# Patient Record
Sex: FEMALE | Race: BLACK OR AFRICAN AMERICAN | NOT HISPANIC OR LATINO | Employment: FULL TIME | URBAN - METROPOLITAN AREA
[De-identification: names, ages, dates, MRNs, and addresses within clinical notes are randomized per-mention and may not be internally consistent; named-entity substitution may affect disease eponyms.]

---

## 2017-01-03 ENCOUNTER — PATIENT MESSAGE (OUTPATIENT)
Dept: FAMILY MEDICINE | Facility: CLINIC | Age: 42
End: 2017-01-03

## 2017-03-08 ENCOUNTER — PATIENT MESSAGE (OUTPATIENT)
Dept: FAMILY MEDICINE | Facility: CLINIC | Age: 42
End: 2017-03-08

## 2017-03-08 RX ORDER — MUPIROCIN 20 MG/G
OINTMENT TOPICAL
Qty: 30 G | Refills: 1 | Status: SHIPPED | OUTPATIENT
Start: 2017-03-08 | End: 2019-03-29 | Stop reason: SDUPTHER

## 2017-04-20 ENCOUNTER — PATIENT MESSAGE (OUTPATIENT)
Dept: FAMILY MEDICINE | Facility: CLINIC | Age: 42
End: 2017-04-20

## 2017-04-24 RX ORDER — FLUTICASONE PROPIONATE AND SALMETEROL 250; 50 UG/1; UG/1
POWDER RESPIRATORY (INHALATION)
Qty: 60 EACH | Refills: 2 | Status: SHIPPED | OUTPATIENT
Start: 2017-04-24 | End: 2017-12-15 | Stop reason: SDUPTHER

## 2017-05-07 ENCOUNTER — PATIENT MESSAGE (OUTPATIENT)
Dept: FAMILY MEDICINE | Facility: CLINIC | Age: 42
End: 2017-05-07

## 2017-05-08 ENCOUNTER — OFFICE VISIT (OUTPATIENT)
Dept: FAMILY MEDICINE | Facility: CLINIC | Age: 42
End: 2017-05-08
Payer: COMMERCIAL

## 2017-05-08 VITALS
HEIGHT: 63 IN | DIASTOLIC BLOOD PRESSURE: 74 MMHG | TEMPERATURE: 99 F | WEIGHT: 185.63 LBS | SYSTOLIC BLOOD PRESSURE: 122 MMHG | HEART RATE: 76 BPM | BODY MASS INDEX: 32.89 KG/M2 | RESPIRATION RATE: 18 BRPM

## 2017-05-08 DIAGNOSIS — J30.1 SEASONAL ALLERGIC RHINITIS DUE TO POLLEN: Primary | ICD-10-CM

## 2017-05-08 DIAGNOSIS — J06.9 VIRAL URI: ICD-10-CM

## 2017-05-08 PROCEDURE — 1160F RVW MEDS BY RX/DR IN RCRD: CPT | Mod: S$GLB,,, | Performed by: NURSE PRACTITIONER

## 2017-05-08 PROCEDURE — 3078F DIAST BP <80 MM HG: CPT | Mod: S$GLB,,, | Performed by: NURSE PRACTITIONER

## 2017-05-08 PROCEDURE — 3074F SYST BP LT 130 MM HG: CPT | Mod: S$GLB,,, | Performed by: NURSE PRACTITIONER

## 2017-05-08 PROCEDURE — 99214 OFFICE O/P EST MOD 30 MIN: CPT | Mod: S$GLB,,, | Performed by: NURSE PRACTITIONER

## 2017-05-08 RX ORDER — MONTELUKAST SODIUM 10 MG/1
10 TABLET ORAL NIGHTLY
Qty: 30 TABLET | Refills: 6 | Status: SHIPPED | OUTPATIENT
Start: 2017-05-08 | End: 2017-06-07

## 2017-05-08 RX ORDER — BENZONATATE 100 MG/1
100 CAPSULE ORAL 3 TIMES DAILY PRN
Qty: 30 CAPSULE | Refills: 0 | Status: SHIPPED | OUTPATIENT
Start: 2017-05-08 | End: 2017-05-18

## 2017-05-08 RX ORDER — IBUPROFEN 800 MG/1
TABLET ORAL
COMMUNITY
Start: 2017-05-06 | End: 2017-06-21 | Stop reason: DRUGHIGH

## 2017-05-08 NOTE — MR AVS SNAPSHOT
[id number] [address] [address]  [facility] [address]  Phone: [phone]  Fax: [phone]                  Noni Lazo   2017 8:40 AM   Office Visit    Description:  Female : 1975   Provider:  Batool Blackburn NP   Department:  [facility]           Reason for Visit     Bilateral ear pain     Sore Throat                To Do List           Goals (5 Years of Data)              Today    10/13/16    7/27/16    Blood Pressure < 140/90   122/74  122/74  122/74    BMI is less than 25             Follow-Up and Disposition     Return in about 1 week (around 5/15/2017).       These Medications        Disp Refills Start End    montelukast (SINGULAIR) 10 mg tablet 30 tablet 6 2017    Take 1 tablet (10 mg total) by mouth every evening. - Oral    Pharmacy: [facility]/pharmacy #[id number]  [place]  [address]. Ph #: [phone]       benzonatate (TESSALON) 100 MG capsule 30 capsule 0 2017    Take 1 capsule (100 mg total) by mouth 3 (three) times daily as needed for Cough. - Oral    Pharmacy: [facility]/pharmacy #[id number]  [place] - [address]. Ph #: [phone]         [facility]     [facility] Care [facility] -  Assistance  Unless otherwise directed by your provider, please contact [facility], our nurse care line that is available for  assistance.     Registered nurses in the [facility] provide: appointment scheduling, clinical advisement, health education, and other advisory services.  Call: [phone] (toll free)               Medications           Message regarding Medications     Verify the changes and/or additions to your medication regime listed below are the same as discussed with your clinician today.  If any of these changes or additions are incorrect, please notify your healthcare provider.        START taking these NEW medications        Refills    montelukast  (SINGULAIR) 10 mg tablet 6    Sig: Take 1 tablet (10 mg total) by mouth every evening.    Class: Normal    Route: Oral    benzonatate (TESSALON) 100 MG capsule 0    Sig: Take 1 capsule (100 mg total) by mouth 3 (three) times daily as needed for Cough.    Class: Normal    Route: Oral           Verify that the below list of medications is an accurate representation of the medications you are currently taking.  If none reported, the list may be blank. If incorrect, please contact your healthcare provider. Carry this list with you in case of emergency.           Current Medications     acetaminophen (TYLENOL) 500 MG tablet Take 2 tablets by mouth Once daily as needed.    amlodipine (NORVASC) 10 MG tablet Take 1 tablet (10 mg total) by mouth once daily.    azelastine-fluticasone 137-50 mcg/spray Spry nassal spray 1 spray by Each Nare route 2 (two) times daily as needed.    fluticasone (FLONASE) 50 mcg/actuation nasal spray 2 sprays by Each Nare route once daily.    fluticasone-salmeterol 250-50 mcg/dose (ADVAIR DISKUS) 250-50 mcg/dose diskus inhaler INHALE 1 PUFF INTO LUNGS AS NEEDED    ibuprofen (ADVIL,MOTRIN) 200 MG tablet Take 200 mg by mouth every 6 (six) hours as needed.      ibuprofen (ADVIL,MOTRIN) 800 MG tablet     mupirocin (BACTROBAN) 2 % ointment Apply topically as needed.    propranolol (INDERAL LA) 160 mg 24 hr capsule Take 1 capsule (160 mg total) by mouth once daily.    triamterene-hydrochlorothiazide 37.5-25 mg (DYAZIDE) 37.5-25 mg per capsule Take 1 capsule by mouth once daily.    benzonatate (TESSALON) 100 MG capsule Take 1 capsule (100 mg total) by mouth 3 (three) times daily as needed for Cough.    montelukast (SINGULAIR) 10 mg tablet Take 1 tablet (10 mg total) by mouth every evening.    sertraline (ZOLOFT) 25 MG tablet Take 1 tablet (25 mg total) by mouth once daily.           Clinical Reference Information           Your Vitals Were     BP Pulse Temp Resp Height Weight    122/74 (BP Location: Left  "arm, Patient Position: Sitting, BP Method: Manual) 76 98.7 °F (37.1 °C) (Oral) 18 5' 3" (1.6 m) 84.2 kg (185 lb 10 oz)    BMI                32.88 kg/m2          Blood Pressure          Most Recent Value    BP  122/74      Allergies as of 5/8/2017     Biaxin [Clarithromycin]    Epinephrine      Immunizations Administered on Date of Encounter - 5/8/2017     None      Language Assistance Services     ATTENTION: Language assistance services are available, free of charge. Please call [phone].      ATENCIÓN: Si habla español, tiene a pantoja disposición servicios gratuitos de asistencia lingüística. Llame al [phone].     CHÚ Ý: N?u b?n nói Ti?ng Vi?t, có các d?ch v? h? tr? ngôn ng? mi?n phí dành cho b?n. G?i s? [phone].         [facility] complies with applicable Federal civil rights laws and does not discriminate on the basis of race, color, national origin, age, disability, or sex.        "

## 2017-05-08 NOTE — PROGRESS NOTES
"Subjective:       Patient ID: Noni Lazo is a 42 y.o. female.    Chief Complaint: Bilateral ear pain (Started yesterday) and Sore Throat (Started yesterday)    Sore Throat    Associated symptoms include congestion and coughing. Pertinent negatives include no diarrhea, headaches or vomiting.   Onset yesterday.  Left sided ear pain. Left sided sinus congestion with yellow sputum. PND. No fever. Has been taking theraflu. Has been sneezing a lot over the past week. No other symptoms. See ROS    The following portion of the patients history was reviewed and updated as appropriate: allergies, current medications, past medical and surgical history. Past social history and problem list reviewed. Family PMH and Past social history reviewed. Tobacco, Illicit drug use reviewed.     Review of Systems   Constitutional: Positive for fatigue. Negative for chills and fever.   HENT: Positive for congestion, postnasal drip, rhinorrhea and sneezing. Negative for sinus pressure and sore throat.    Respiratory: Positive for cough. Negative for chest tightness and wheezing.    Cardiovascular: Negative for chest pain and palpitations.   Gastrointestinal: Negative for diarrhea, nausea and vomiting.   Musculoskeletal: Negative for myalgias.   Skin: Negative for rash and wound.   Neurological: Negative for weakness and headaches.            Objective:       /74 (BP Location: Left arm, Patient Position: Sitting, BP Method: Manual)  Pulse 76  Temp 98.7 °F (37.1 °C) (Oral)   Resp 18  Ht 5' 3" (1.6 m)  Wt 84.2 kg (185 lb 10 oz)  BMI 32.88 kg/m2     Physical Exam    Constitutional:  well-developed and well-nourished.   Head: Normocephalic.   Ears: Canals normal. Mild air/fluid levels bilaterally.  Nose: Rhinorrhea    No sinus tenderness present over maxillary sinus area.  Mouth/Throat: Uvula is midline and mucous membranes are normal. No Posterior oropharyngeal erythema present. PND  Eyes: Conjunctivae are normal. Pupils are " equal, round, and reactive to light.   Neck: Normal range of motion. Neck supple. mild inflammation and tenderness to anterior cervical lymph nodes  Cardiovascular: Normal rate and regular rhythm.    Pulmonary/Chest: Effort normal and breath sounds normal.   Skin:  W/D. No rashes or lesions   Musculoskeletal: Normal range of motion.   Assessment:       1. Seasonal allergic rhinitis due to pollen    2. Viral URI        Plan:         Noni was seen today for bilateral ear pain and sore throat.    Diagnoses and all orders for this visit:    Seasonal allergic rhinitis due to pollen: presents as allergies and viral URI. Continue the flonase nasal spray. Refill singulair. Take as directed.     Viral URI: presents as viral. No indication that antibiotics are needed at this time.    Other orders  -     montelukast (SINGULAIR) 10 mg tablet; Take 1 tablet (10 mg total) by mouth every evening.  -     benzonatate (TESSALON) 100 MG capsule; Take 1 capsule (100 mg total) by mouth 3 (three) times daily as needed for Cough.    Continue current medication  Take medications only as prescribed  Healthy diet, exercise  Adequate rest  Adequate hydration  Avoid allergens  Avoid excessive caffeine

## 2017-05-08 NOTE — LETTER
May 8, 2017      [facility]  [address] [address] [address]  [facility] [address]  Phone: [phone]  Fax: [phone]       Patient: Noni Lazo   YOB: 1975  Date of Visit: 05/08/2017    To Whom It May Concern:    Noni  was at [facility] on 05/08/2017.  Please excuse Noni from her work absence.   She may return to work on 05/09/2017.   If you have any questions or concerns, or if I can be of further assistance, please do not hesitate to contact me.    Sincerely,      Batool Blackburn NP

## 2017-05-08 NOTE — TELEPHONE ENCOUNTER
Spoke with pt and she has complaints of congestion and ear pain.  Stated she is coming to clinic to bring her mother.  sched pt also with ANGIE Blackburn.

## 2017-06-01 ENCOUNTER — PATIENT MESSAGE (OUTPATIENT)
Dept: FAMILY MEDICINE | Facility: CLINIC | Age: 42
End: 2017-06-01

## 2017-06-01 NOTE — LETTER
June 1, 2017    Noni Lazo  [address] [address]             [facility]  [id number] [place] [address] [address]  [facility] [address]  Phone: [phone]  Fax: [phone] Dear Whom it may concern:    Mrs. Noni Lazo is being seen in my office. The patient is currently in good health. She is being treated for high blood pressure, which at this time her condition is controlled.      If you have any questions or concerns, please don't hesitate to call.    Sincerely,        ANGIE Rajput

## 2017-06-02 ENCOUNTER — PATIENT MESSAGE (OUTPATIENT)
Dept: FAMILY MEDICINE | Facility: CLINIC | Age: 42
End: 2017-06-02

## 2017-06-02 RX ORDER — ESOMEPRAZOLE MAGNESIUM 40 MG/1
40 CAPSULE, DELAYED RELEASE ORAL
Qty: 30 CAPSULE | Refills: 11 | Status: SHIPPED | OUTPATIENT
Start: 2017-06-02 | End: 2017-11-01

## 2017-06-02 RX ORDER — BENZONATATE 200 MG/1
200 CAPSULE ORAL 3 TIMES DAILY PRN
Qty: 30 CAPSULE | Refills: 0 | Status: SHIPPED | OUTPATIENT
Start: 2017-06-02 | End: 2017-06-12

## 2017-06-02 NOTE — TELEPHONE ENCOUNTER
LOV 5/8/17. Pt is requesting something to help with her cough that has no subsided since her last visit. Please advise. GORDO Hammer LPN

## 2017-06-19 ENCOUNTER — PATIENT MESSAGE (OUTPATIENT)
Dept: FAMILY MEDICINE | Facility: CLINIC | Age: 42
End: 2017-06-19

## 2017-06-20 RX ORDER — SERTRALINE HYDROCHLORIDE 25 MG/1
25 TABLET, FILM COATED ORAL DAILY
Qty: 30 TABLET | Refills: 6 | Status: SHIPPED | OUTPATIENT
Start: 2017-06-20 | End: 2018-03-23

## 2017-06-21 ENCOUNTER — OFFICE VISIT (OUTPATIENT)
Dept: FAMILY MEDICINE | Facility: CLINIC | Age: 42
End: 2017-06-21
Payer: COMMERCIAL

## 2017-06-21 ENCOUNTER — PATIENT MESSAGE (OUTPATIENT)
Dept: FAMILY MEDICINE | Facility: CLINIC | Age: 42
End: 2017-06-21

## 2017-06-21 VITALS
RESPIRATION RATE: 18 BRPM | HEART RATE: 60 BPM | TEMPERATURE: 99 F | WEIGHT: 186.63 LBS | DIASTOLIC BLOOD PRESSURE: 74 MMHG | BODY MASS INDEX: 33.07 KG/M2 | SYSTOLIC BLOOD PRESSURE: 112 MMHG | HEIGHT: 63 IN

## 2017-06-21 DIAGNOSIS — H66.92 LEFT OTITIS MEDIA, UNSPECIFIED CHRONICITY, UNSPECIFIED OTITIS MEDIA TYPE: Primary | ICD-10-CM

## 2017-06-21 DIAGNOSIS — R42 DIZZINESS: ICD-10-CM

## 2017-06-21 PROCEDURE — 99214 OFFICE O/P EST MOD 30 MIN: CPT | Mod: S$GLB,,, | Performed by: NURSE PRACTITIONER

## 2017-06-21 RX ORDER — AMOXICILLIN 875 MG/1
875 TABLET, FILM COATED ORAL EVERY 12 HOURS
Qty: 14 TABLET | Refills: 0 | Status: SHIPPED | OUTPATIENT
Start: 2017-06-21 | End: 2017-06-28

## 2017-06-21 RX ORDER — PREDNISONE 20 MG/1
40 TABLET ORAL DAILY
Qty: 20 TABLET | Refills: 0 | Status: SHIPPED | OUTPATIENT
Start: 2017-06-21 | End: 2017-07-01

## 2017-06-22 NOTE — TELEPHONE ENCOUNTER
Per Chanelle: ok to take prednisone twice a day instead of two at a time. Pt notified via my chart. GORDO Hammer LPN

## 2017-06-22 NOTE — PROGRESS NOTES
"Subjective:       Patient ID: Noni Lazo is a 42 y.o. female.    Chief Complaint: Dizziness and Sinus Pressure    Dizziness:   Chronicity:  New  Onset: 2-3 days ago.  Progression since onset:  Unchanged  Frequency:  Every few hours  Duration:  Very brief  Dizziness characteristics:  Spacial disorientation, sensation of movement and off-balance   Associated symptoms: ear congestion, ear pain and headaches.no hearing loss, no fever, no tinnitus, no nausea, no vomiting, no diaphoresis, no aural fullness, no weakness, no visual disturbances, no light-headedness, no palpitations, no panic, no facial weakness, no slurred speech, no numbness in extremities and no chest pain.    Review of Systems   Constitutional: Negative for activity change, diaphoresis, fever and unexpected weight change.   HENT: Positive for ear pain and postnasal drip. Negative for hearing loss, rhinorrhea, tinnitus and trouble swallowing.    Eyes: Positive for discharge and visual disturbance.   Respiratory: Negative for chest tightness and wheezing.    Cardiovascular: Negative for chest pain and palpitations.   Gastrointestinal: Negative for blood in stool, constipation, diarrhea, nausea and vomiting.   Endocrine: Negative for polydipsia and polyuria.   Genitourinary: Negative for difficulty urinating, dysuria, hematuria and menstrual problem.   Musculoskeletal: Negative for arthralgias, joint swelling and neck pain.   Neurological: Positive for dizziness and headaches. Negative for weakness and light-headedness.   Psychiatric/Behavioral: Negative for confusion and dysphoric mood.       Objective:       Vitals:    06/21/17 1554   BP: 112/74   Pulse: 60   Resp: 18   Temp: 98.6 °F (37 °C)   TempSrc: Oral   Weight: 84.6 kg (186 lb 9.9 oz)   Height: 5' 3" (1.6 m)   PainSc: 0-No pain       Physical Exam   Constitutional: She is oriented to person, place, and time. She appears well-developed.   Obese female    HENT:   Head: Normocephalic and " atraumatic.   Right Ear: External ear normal. Tympanic membrane mobility is abnormal.   Left Ear: External ear normal. Tympanic membrane is erythematous and bulging.   Nose: Mucosal edema and rhinorrhea present.   Mouth/Throat: Posterior oropharyngeal edema present. No posterior oropharyngeal erythema.   Eyes: Conjunctivae are normal. Pupils are equal, round, and reactive to light.   Neck: Normal range of motion. Neck supple. No tracheal deviation present.   Cardiovascular: Normal rate and regular rhythm.  Exam reveals no gallop and no friction rub.    No murmur heard.  Pulmonary/Chest: Effort normal and breath sounds normal. No stridor. No respiratory distress. She has no wheezes. She has no rales.   Musculoskeletal: Normal range of motion.   Lymphadenopathy:     She has no cervical adenopathy.   Neurological: She is alert and oriented to person, place, and time.   Skin: Skin is warm and dry.   Psychiatric: She has a normal mood and affect. Her behavior is normal. Judgment and thought content normal.       Assessment:       1. Left otitis media, unspecified chronicity, unspecified otitis media type    2. Dizziness        Plan:       Noni was seen today for dizziness and sinus pressure.    Diagnoses and all orders for this visit:    Otitis media to the left side  -     amoxicillin (AMOXIL) 875 MG tablet; Take 1 tablet (875 mg total) by mouth every 12 (twelve) hours.    Dizziness  -     predniSONE (DELTASONE) 20 MG tablet; Take 2 tablets (40 mg total) by mouth once daily.

## 2017-07-12 ENCOUNTER — PATIENT MESSAGE (OUTPATIENT)
Dept: FAMILY MEDICINE | Facility: CLINIC | Age: 42
End: 2017-07-12

## 2017-08-11 RX ORDER — PROPRANOLOL HYDROCHLORIDE 160 MG/1
160 CAPSULE, EXTENDED RELEASE ORAL DAILY
Qty: 90 CAPSULE | Refills: 3 | Status: SHIPPED | OUTPATIENT
Start: 2017-08-11 | End: 2017-08-21 | Stop reason: SDUPTHER

## 2017-08-19 ENCOUNTER — PATIENT MESSAGE (OUTPATIENT)
Dept: FAMILY MEDICINE | Facility: CLINIC | Age: 42
End: 2017-08-19

## 2017-08-21 RX ORDER — TRIAMTERENE AND HYDROCHLOROTHIAZIDE 37.5; 25 MG/1; MG/1
1 CAPSULE ORAL DAILY
Qty: 90 CAPSULE | Refills: 3 | Status: SHIPPED | OUTPATIENT
Start: 2017-08-21 | End: 2018-10-27 | Stop reason: SDUPTHER

## 2017-08-21 RX ORDER — PROPRANOLOL HYDROCHLORIDE 160 MG/1
160 CAPSULE, EXTENDED RELEASE ORAL DAILY
Qty: 90 CAPSULE | Refills: 3 | Status: SHIPPED | OUTPATIENT
Start: 2017-08-21 | End: 2018-09-05 | Stop reason: SDUPTHER

## 2017-08-21 RX ORDER — AMLODIPINE BESYLATE 10 MG/1
10 TABLET ORAL DAILY
Qty: 90 TABLET | Refills: 2 | Status: SHIPPED | OUTPATIENT
Start: 2017-08-21 | End: 2018-07-14 | Stop reason: SDUPTHER

## 2017-08-24 ENCOUNTER — OFFICE VISIT (OUTPATIENT)
Dept: FAMILY MEDICINE | Facility: CLINIC | Age: 42
End: 2017-08-24

## 2017-08-24 VITALS
SYSTOLIC BLOOD PRESSURE: 126 MMHG | DIASTOLIC BLOOD PRESSURE: 74 MMHG | HEIGHT: 63 IN | WEIGHT: 188.5 LBS | RESPIRATION RATE: 16 BRPM | BODY MASS INDEX: 33.4 KG/M2 | TEMPERATURE: 98 F | HEART RATE: 80 BPM

## 2017-08-24 DIAGNOSIS — R35.0 FREQUENCY OF URINATION: ICD-10-CM

## 2017-08-24 DIAGNOSIS — J01.00 ACUTE MAXILLARY SINUSITIS, RECURRENCE NOT SPECIFIED: ICD-10-CM

## 2017-08-24 DIAGNOSIS — J30.1 ACUTE NONSEASONAL ALLERGIC RHINITIS DUE TO POLLEN: Primary | ICD-10-CM

## 2017-08-24 DIAGNOSIS — R42 DIZZINESS: ICD-10-CM

## 2017-08-24 DIAGNOSIS — R53.83 FATIGUE, UNSPECIFIED TYPE: ICD-10-CM

## 2017-08-24 LAB
BILIRUB SERPL-MCNC: NORMAL MG/DL
BLOOD URINE, POC: NORMAL
COLOR, POC UA: NORMAL
GLUCOSE UR QL STRIP: NORMAL
KETONES UR QL STRIP: NORMAL
LEUKOCYTE ESTERASE URINE, POC: NORMAL
NITRITE, POC UA: NORMAL
PH, POC UA: 7
PROTEIN, POC: NORMAL
SPECIFIC GRAVITY, POC UA: 1
UROBILINOGEN, POC UA: NORMAL

## 2017-08-24 PROCEDURE — 99214 OFFICE O/P EST MOD 30 MIN: CPT | Mod: 25,S$GLB,, | Performed by: NURSE PRACTITIONER

## 2017-08-24 PROCEDURE — 3074F SYST BP LT 130 MM HG: CPT | Mod: S$GLB,,, | Performed by: NURSE PRACTITIONER

## 2017-08-24 PROCEDURE — 96372 THER/PROPH/DIAG INJ SC/IM: CPT | Mod: S$GLB,,, | Performed by: NURSE PRACTITIONER

## 2017-08-24 PROCEDURE — 3008F BODY MASS INDEX DOCD: CPT | Mod: S$GLB,,, | Performed by: NURSE PRACTITIONER

## 2017-08-24 PROCEDURE — 81002 URINALYSIS NONAUTO W/O SCOPE: CPT | Mod: S$GLB,,, | Performed by: NURSE PRACTITIONER

## 2017-08-24 PROCEDURE — 3078F DIAST BP <80 MM HG: CPT | Mod: S$GLB,,, | Performed by: NURSE PRACTITIONER

## 2017-08-24 RX ORDER — DEXAMETHASONE SODIUM PHOSPHATE 4 MG/ML
8 INJECTION, SOLUTION INTRA-ARTICULAR; INTRALESIONAL; INTRAMUSCULAR; INTRAVENOUS; SOFT TISSUE ONCE
Status: COMPLETED | OUTPATIENT
Start: 2017-08-24 | End: 2017-08-24

## 2017-08-24 RX ORDER — AMOXICILLIN 875 MG/1
875 TABLET, FILM COATED ORAL EVERY 12 HOURS
Qty: 14 TABLET | Refills: 0 | Status: SHIPPED | OUTPATIENT
Start: 2017-08-24 | End: 2017-09-28

## 2017-08-24 RX ADMIN — DEXAMETHASONE SODIUM PHOSPHATE 8 MG: 4 INJECTION, SOLUTION INTRA-ARTICULAR; INTRALESIONAL; INTRAMUSCULAR; INTRAVENOUS; SOFT TISSUE at 12:08

## 2017-08-24 NOTE — LETTER
August 24, 2017      [facility]  [address] [address] [address]  [facility] [address]  Phone: [phone]  Fax: [phone]       Patient: Noni Lazo   YOB: 1975  Date of Visit: 08/24/2017    To Whom It May Concern:    Mark Lazo  was at [facility] on 08/24/2017. She may return to work/school on 08/28/2017.  If you have any questions or concerns, or if I can be of further assistance, please do not hesitate to contact me.    Sincerely,      Batool BlackburnNP

## 2017-08-25 NOTE — PROGRESS NOTES
Subjective:       Patient ID: Noni Lazo is a 42 y.o. female.    Chief Complaint: fatigue, dizzy, spinning and Urinary Frequency    HPI onset the past week with sinus congestion, dizziness off and on. Pressure to both ears. Thick PND.  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 She feels this is what triggered this episode. She has been feeling a lot of pressure in her head since being exposed to this every day. She feels worse when at school and around it. They are now finished with the project and she feels she has infection from it. She denies any fever. She is more fatigued. She has also noticed some urinary frequency. NO urgency or burning with urination. She is trying to drink plenty of fluids. See ROS.    The following portion of the patients history was reviewed and updated as appropriate: allergies, current medications, past medical and surgical history. Past social history and problem list reviewed. Family PMH and Past social history reviewed. Tobacco, Illicit drug use reviewed.     Review of Systems  Constitutional: increased fatigue    No fever    HENT: mild sore throat   Lots of nasal congestion. Pressure and tenderness to sinus area. Ears with pressure, popping. No voice changes    Eyes: No vision changes, blurred vision  Skin: no rashes or lesions  Respiratory:   No SOB, Wheezing, cough  Cardiovascular:   No CP, Palpitations  Gastrointestinal:   No N/V/D. No abdominal pain, weight stable. Appetite good.   Genitourinary:   No dysuria, urgency   Positive for frequency. No change in bowels. No blood in stools.   Musculoskeletal:   No joint pain  No change in gait or coordination. .  Neurological:   positive for dizziness. and headaches  Hematological: No abnormal bruising or bleeding    Psychiatric/Behavioral Negative for suicidal ideas.  Denies feelings of depression. No thoughts of wanting to harm self or others.     Objective:  "    /74   Pulse 80   Temp 98.3 °F (36.8 °C) (Oral)   Resp 16   Ht 5' 3" (1.6 m)   Wt 85.5 kg (188 lb 7.9 oz)   LMP 08/24/2016   BMI 33.39 kg/m²      Physical Exam     Constitutional: oriented to person, place, and time. well-developed and well-nourished.   HENT: throat clear of exudate. Thick PND noted to posterior pharynx. Sinus area tenderness and fullness. Canals clear. TM retracted.   Head: Normocephalic.   Eyes: Conjunctivae are normal. Pupils are equal, round, and reactive to light.   Neck: Normal range of motion. Neck supple. No tracheal deviation present. No thyromegaly present. enlarged and tender anterior cervical lymph nodes.  Cardiovascular: Normal rate, regular rhythm and normal heart sounds.    Pulmonary/Chest: Effort normal and breath sounds normal. No respiratory distress. No wheezes.   Abdominal: Soft. Bowel sounds are normal. No distension. There is no tenderness.   Musculoskeletal: Normal range of motion. Gait and coordination normal.   Neurological: oriented to person, place, and time.   Skin: Skin is warm and dry. No rashes or lesions  Psychiatric: Normal mood and affect.Behavior is normal. Judgment and thought content normal.   Assessment:       1. Acute nonseasonal allergic rhinitis due to pollen    2. Acute maxillary sinusitis, recurrence not specified    3. Frequency of urination    4. Dizziness    5. Fatigue, unspecified type        Plan:         Noni was seen today for fatigue, dizzy, spinning and urinary frequency.    Diagnoses and all orders for this visit:    Acute nonseasonal allergic rhinitis due to pollen: continue the flonase. Take zyrtec daily.     Acute maxillary sinusitis, recurrence not specified: will cover with amoxil. Take as directed.     Frequency of urination: urine was negative.   -     POCT URINE DIPSTICK WITHOUT MICROSCOPE  Results for orders placed or performed in visit on 08/24/17   POCT URINE DIPSTICK WITHOUT MICROSCOPE   Result Value Ref Range    " Color, UA lite yellow     Spec Grav UA 1.000     pH, UA 7     WBC, UA ++     Nitrite, UA neg     Protein neg     Glucose, UA normal     Ketones, UA neg     Urobilinogen, UA normal     Bilirubin neg     Blood, UA neg      Dizziness  -     dexamethasone injection 8 mg; Inject 2 mLs (8 mg total) into the muscle once.  Risks and benefits discussed. Potential side effects such as anxiety, palpitations and flushing discussed. May increase blood glucose levels over the next 48 hours. Potential for skin atrophy at injection site. Tolerated injection well.    Fatigue, unspecified type: should improve with resolution of allergies.     Other orders  -     amoxicillin (AMOXIL) 875 MG tablet; Take 1 tablet (875 mg total) by mouth every 12 (twelve) hours.    Continue current medication  Take medications only as prescribed  Healthy diet, exercise  Adequate rest  Adequate hydration  Avoid allergens  Avoid excessive caffeine

## 2017-08-28 ENCOUNTER — PATIENT MESSAGE (OUTPATIENT)
Dept: FAMILY MEDICINE | Facility: CLINIC | Age: 42
End: 2017-08-28

## 2017-08-28 RX ORDER — FLUTICASONE PROPIONATE 50 MCG
SPRAY, SUSPENSION (ML) NASAL
Qty: 16 G | Refills: 2 | Status: SHIPPED | OUTPATIENT
Start: 2017-08-28 | End: 2017-11-01 | Stop reason: SDUPTHER

## 2017-08-29 ENCOUNTER — PATIENT MESSAGE (OUTPATIENT)
Dept: FAMILY MEDICINE | Facility: CLINIC | Age: 42
End: 2017-08-29

## 2017-08-30 ENCOUNTER — PATIENT MESSAGE (OUTPATIENT)
Dept: FAMILY MEDICINE | Facility: CLINIC | Age: 42
End: 2017-08-30

## 2017-08-31 RX ORDER — FLUTICASONE PROPIONATE 50 MCG
SPRAY, SUSPENSION (ML) NASAL
Refills: 2 | OUTPATIENT
Start: 2017-08-31

## 2017-09-27 ENCOUNTER — PATIENT MESSAGE (OUTPATIENT)
Dept: FAMILY MEDICINE | Facility: CLINIC | Age: 42
End: 2017-09-27

## 2017-09-28 RX ORDER — AMOXICILLIN 875 MG/1
875 TABLET, FILM COATED ORAL EVERY 12 HOURS
Qty: 20 TABLET | Refills: 0 | Status: SHIPPED | OUTPATIENT
Start: 2017-09-28 | End: 2017-11-01 | Stop reason: ALTCHOICE

## 2017-09-28 NOTE — TELEPHONE ENCOUNTER
Sent amoxil to the pharmacy. Most likely allergies if no fever or green mucous. Continue allergy medication. Need to see ENT if these symptoms continue to flare up as frequently as they have been.

## 2017-10-10 DIAGNOSIS — I10 ESSENTIAL HYPERTENSION: Primary | ICD-10-CM

## 2017-10-11 RX ORDER — TRIAMTERENE AND HYDROCHLOROTHIAZIDE 37.5; 25 MG/1; MG/1
1 CAPSULE ORAL DAILY
Qty: 90 CAPSULE | Refills: 0 | Status: SHIPPED | OUTPATIENT
Start: 2017-10-11 | End: 2017-11-01 | Stop reason: SDUPTHER

## 2017-10-11 NOTE — TELEPHONE ENCOUNTER
She is due for labs to check her potassium, etc since on BP medication. I will place labs, please schedule her to have those done soon. One refill of BP medication sent until labs are reviewed.

## 2017-10-13 NOTE — TELEPHONE ENCOUNTER
Attempted to contact pt. No voicemail set up to leave message to return call to clinic. Call rang several times before going to busy signal.

## 2017-10-20 ENCOUNTER — LAB VISIT (OUTPATIENT)
Dept: LAB | Facility: HOSPITAL | Age: 42
End: 2017-10-20
Attending: NURSE PRACTITIONER
Payer: COMMERCIAL

## 2017-10-20 DIAGNOSIS — I10 ESSENTIAL HYPERTENSION: ICD-10-CM

## 2017-10-20 LAB
ALBUMIN SERPL BCP-MCNC: 3.8 G/DL
ALP SERPL-CCNC: 58 U/L
ALT SERPL W/O P-5'-P-CCNC: 27 U/L
ANION GAP SERPL CALC-SCNC: 15 MMOL/L
AST SERPL-CCNC: 23 U/L
BASOPHILS # BLD AUTO: 0.06 K/UL
BASOPHILS NFR BLD: 1.1 %
BILIRUB SERPL-MCNC: 0.4 MG/DL
BUN SERPL-MCNC: 11 MG/DL
CALCIUM SERPL-MCNC: 10.1 MG/DL
CHLORIDE SERPL-SCNC: 99 MMOL/L
CHOLEST SERPL-MCNC: 188 MG/DL
CHOLEST/HDLC SERPL: 3.7 {RATIO}
CO2 SERPL-SCNC: 27 MMOL/L
CREAT SERPL-MCNC: 0.9 MG/DL
DIFFERENTIAL METHOD: ABNORMAL
EOSINOPHIL # BLD AUTO: 0.1 K/UL
EOSINOPHIL NFR BLD: 2.1 %
ERYTHROCYTE [DISTWIDTH] IN BLOOD BY AUTOMATED COUNT: 13.6 %
EST. GFR  (AFRICAN AMERICAN): >60 ML/MIN/1.73 M^2
EST. GFR  (NON AFRICAN AMERICAN): >60 ML/MIN/1.73 M^2
GLUCOSE SERPL-MCNC: 107 MG/DL
HCT VFR BLD AUTO: 43.1 %
HDLC SERPL-MCNC: 51 MG/DL
HDLC SERPL: 27.1 %
HGB BLD-MCNC: 14 G/DL
IMM GRANULOCYTES # BLD AUTO: 0.01 K/UL
IMM GRANULOCYTES NFR BLD AUTO: 0.2 %
LDLC SERPL CALC-MCNC: 118 MG/DL
LYMPHOCYTES # BLD AUTO: 1.8 K/UL
LYMPHOCYTES NFR BLD: 31.8 %
MCH RBC QN AUTO: 26.8 PG
MCHC RBC AUTO-ENTMCNC: 32.5 G/DL
MCV RBC AUTO: 82 FL
MONOCYTES # BLD AUTO: 0.5 K/UL
MONOCYTES NFR BLD: 9.1 %
NEUTROPHILS # BLD AUTO: 3.1 K/UL
NEUTROPHILS NFR BLD: 55.7 %
NONHDLC SERPL-MCNC: 137 MG/DL
NRBC BLD-RTO: 0 /100 WBC
PLATELET # BLD AUTO: 407 K/UL
PMV BLD AUTO: 11.1 FL
POTASSIUM SERPL-SCNC: 2.9 MMOL/L
PROT SERPL-MCNC: 7.9 G/DL
RBC # BLD AUTO: 5.23 M/UL
SODIUM SERPL-SCNC: 141 MMOL/L
TRIGL SERPL-MCNC: 95 MG/DL
WBC # BLD AUTO: 5.6 K/UL

## 2017-10-20 PROCEDURE — 80061 LIPID PANEL: CPT

## 2017-10-20 PROCEDURE — 85025 COMPLETE CBC W/AUTO DIFF WBC: CPT

## 2017-10-20 PROCEDURE — 80053 COMPREHEN METABOLIC PANEL: CPT

## 2017-10-20 PROCEDURE — 36415 COLL VENOUS BLD VENIPUNCTURE: CPT | Mod: PO

## 2017-10-23 ENCOUNTER — TELEPHONE (OUTPATIENT)
Dept: FAMILY MEDICINE | Facility: CLINIC | Age: 42
End: 2017-10-23

## 2017-10-23 DIAGNOSIS — E87.6 LOW BLOOD POTASSIUM: Primary | ICD-10-CM

## 2017-10-23 RX ORDER — POTASSIUM CHLORIDE 20 MEQ/1
20 TABLET, EXTENDED RELEASE ORAL 2 TIMES DAILY
Qty: 60 TABLET | Refills: 0 | Status: SHIPPED | OUTPATIENT
Start: 2017-10-23 | End: 2018-03-23

## 2017-10-23 NOTE — TELEPHONE ENCOUNTER
She has a potassium of 2.9.  She needs replacement supplement. I will send to pharmacy. Need to repeat her CMP to see if this is an accurate value. Orders placed. If she is having any CP, SOB or palpitations she  Needs to go to the ER.

## 2017-10-23 NOTE — TELEPHONE ENCOUNTER
Spoke to pt.  She verbalized understanding of lab result and of low potassium and recommendations.  Pt stated she now and then has palpitations, but could be due to her running around a lot because her mother is in the hospital.  Pt stated she will try to make it to the lab in [place] to repeat on wed.  cmp pended.

## 2017-10-24 NOTE — TELEPHONE ENCOUNTER
Pt contacted and stated she can not make an appointment at this time she will call when she has time

## 2017-11-01 ENCOUNTER — LAB VISIT (OUTPATIENT)
Dept: LAB | Facility: HOSPITAL | Age: 42
End: 2017-11-01
Attending: NURSE PRACTITIONER
Payer: COMMERCIAL

## 2017-11-01 ENCOUNTER — OFFICE VISIT (OUTPATIENT)
Dept: FAMILY MEDICINE | Facility: CLINIC | Age: 42
End: 2017-11-01
Payer: COMMERCIAL

## 2017-11-01 VITALS
DIASTOLIC BLOOD PRESSURE: 74 MMHG | OXYGEN SATURATION: 98 % | TEMPERATURE: 98 F | RESPIRATION RATE: 16 BRPM | BODY MASS INDEX: 31.43 KG/M2 | HEART RATE: 79 BPM | WEIGHT: 177.38 LBS | HEIGHT: 63 IN | SYSTOLIC BLOOD PRESSURE: 128 MMHG

## 2017-11-01 DIAGNOSIS — J30.89 CHRONIC NONSEASONAL ALLERGIC RHINITIS DUE TO POLLEN: ICD-10-CM

## 2017-11-01 DIAGNOSIS — I10 ESSENTIAL HYPERTENSION: ICD-10-CM

## 2017-11-01 DIAGNOSIS — E87.6 LOW SERUM POTASSIUM: Primary | ICD-10-CM

## 2017-11-01 DIAGNOSIS — E87.6 LOW BLOOD POTASSIUM: ICD-10-CM

## 2017-11-01 LAB
ALBUMIN SERPL BCP-MCNC: 3.6 G/DL
ALP SERPL-CCNC: 50 U/L
ALT SERPL W/O P-5'-P-CCNC: 24 U/L
ANION GAP SERPL CALC-SCNC: 8 MMOL/L
AST SERPL-CCNC: 20 U/L
BILIRUB SERPL-MCNC: 0.3 MG/DL
BUN SERPL-MCNC: 11 MG/DL
CALCIUM SERPL-MCNC: 9.5 MG/DL
CHLORIDE SERPL-SCNC: 103 MMOL/L
CO2 SERPL-SCNC: 32 MMOL/L
CREAT SERPL-MCNC: 0.8 MG/DL
EST. GFR  (AFRICAN AMERICAN): >60 ML/MIN/1.73 M^2
EST. GFR  (NON AFRICAN AMERICAN): >60 ML/MIN/1.73 M^2
GLUCOSE SERPL-MCNC: 119 MG/DL
POTASSIUM SERPL-SCNC: 3.4 MMOL/L
PROT SERPL-MCNC: 7.2 G/DL
SODIUM SERPL-SCNC: 143 MMOL/L

## 2017-11-01 PROCEDURE — 80053 COMPREHEN METABOLIC PANEL: CPT

## 2017-11-01 PROCEDURE — 99214 OFFICE O/P EST MOD 30 MIN: CPT | Mod: S$GLB,,, | Performed by: NURSE PRACTITIONER

## 2017-11-01 PROCEDURE — 36415 COLL VENOUS BLD VENIPUNCTURE: CPT | Mod: PO

## 2017-11-01 RX ORDER — FLUTICASONE PROPIONATE 50 MCG
2 SPRAY, SUSPENSION (ML) NASAL DAILY
Qty: 16 G | Refills: 2 | Status: SHIPPED | OUTPATIENT
Start: 2017-11-01 | End: 2018-03-23 | Stop reason: SDUPTHER

## 2017-11-01 RX ORDER — MONTELUKAST SODIUM 10 MG/1
10 TABLET ORAL NIGHTLY
Qty: 30 TABLET | Refills: 3 | Status: SHIPPED | OUTPATIENT
Start: 2017-11-01 | End: 2019-03-18

## 2017-11-01 RX ORDER — METHOCARBAMOL 750 MG/1
TABLET, FILM COATED ORAL
Refills: 0 | COMMUNITY
Start: 2017-10-19 | End: 2018-03-23

## 2017-11-01 RX ORDER — MONTELUKAST SODIUM 10 MG/1
10 TABLET ORAL NIGHTLY
COMMUNITY
End: 2017-11-01 | Stop reason: SDUPTHER

## 2017-11-01 NOTE — PROGRESS NOTES
Answers for HPI/ROS submitted by the patient on 10/29/2017   activity change: No  unexpected weight change: No  neck pain: No  hearing loss: No  rhinorrhea: No  trouble swallowing: No  eye discharge: No  visual disturbance: No  chest tightness: No  wheezing: No  chest pain: No  palpitations: Yes  blood in stool: No  constipation: No  vomiting: No  diarrhea: No  polydipsia: No  polyuria: No  difficulty urinating: No  hematuria: No  menstrual problem: No  dysuria: No  joint swelling: No  arthralgias: No  headaches: Yes  weakness: No  confusion: No  dysphoric mood: Yes

## 2017-11-01 NOTE — PROGRESS NOTES
"Subjective:       Patient ID: Noni Lazo is a 42 y.o. female.    Chief Complaint: Follow-up    HPI Answers for HPI/ROS submitted by the patient on 10/29/2017   activity change: No  unexpected weight change: No  neck pain: No  hearing loss: No  rhinorrhea: No  trouble swallowing: No  eye discharge: No  visual disturbance: No  chest tightness: No  wheezing: No  chest pain: No  palpitations: Yes  blood in stool: No  constipation: No  vomiting: No  diarrhea: No  polydipsia: No  polyuria: No  difficulty urinating: No  hematuria: No  menstrual problem: No  dysuria: No  joint swelling: No  arthralgias: No  headaches: Yes  weakness: No  confusion: No  dysphoric mood: Yes    Here for follow up on low potassium. She went this morning to have her repeat labs done. She did not start the potassium I sent in for her until yesterday, states she was just eating a lot of bananas. She has had a few episodes of feeling like palpitations but those do not last long. Denies any "chest pain". She has been under more stress with her mother being sick again. She denies any acute issues today. See ROS.      The following portion of the patients history was reviewed and updated as appropriate: allergies, current medications, past medical and surgical history. Past social history and problem list reviewed. Family PMH and Past social history reviewed. Tobacco, Illicit drug use reviewed.     Review of Systems  Constitutional: No fatigue or fever    HENT: no sore throat or nasal congestion. No voice changes    Eyes: No vision changes, blurred vision  Skin: no rashes or lesions  Respiratory:   No SOB, Wheezing, cough  Cardiovascular:   No CP.  A few episodes of  Palpitations, noting that lasts long.   Gastrointestinal:   No N/V/D. No abdominal pain, weight stable. Appetite good.   Genitourinary:   No dysuria, urgency or frequency. No change in bowels. No blood in stools.   Musculoskeletal:   No joint pain  No change in gait or coordination. " ".  Neurological:   No dizziness. No headaches  Hematological: No abnormal bruising or bleeding    Psychiatric/Behavioral Negative for suicidal ideas.  Denies feelings of depression. No thoughts of wanting to harm self or others.     Objective:     /74   Pulse 79   Temp 98.4 °F (36.9 °C) (Oral)   Resp 16   Ht 5' 3" (1.6 m)   Wt 80.5 kg (177 lb 6.4 oz)   SpO2 98%   BMI 31.42 kg/m²      Physical Exam     Constitutional: oriented to person, place, and time. well-developed and well-nourished.   HENT: normal  Head: Normocephalic.   Eyes: Conjunctivae are normal. Pupils are equal, round, and reactive to light.   Neck: Normal range of motion. Neck supple. No tracheal deviation present. No thyromegaly present.   Cardiovascular: Normal rate, regular rhythm and normal heart sounds.    Pulmonary/Chest: Effort normal and breath sounds normal. No respiratory distress. No wheezes.   Musculoskeletal: Normal range of motion. Gait and coordination normal  Assessment:       1. Low serum potassium    2. Essential hypertension    3. Chronic nonseasonal allergic rhinitis due to pollen        Plan:         Noni was seen today for follow-up.    Diagnoses and all orders for this visit:    Low serum potassium: had labs redrawn this morning.  Will call her when those results are  Available.    Essential hypertension: good control. Continue current medications    Chronic nonseasonal allergic rhinitis due to pollen: refill singulair and flonase.     Other orders  -     montelukast (SINGULAIR) 10 mg tablet; Take 1 tablet (10 mg total) by mouth every evening.  -     fluticasone (FLONASE) 50 mcg/actuation nasal spray; 2 sprays by Each Nare route once daily.    Continue current medication  Take medications only as prescribed  Healthy diet, exercise  Adequate rest  Adequate hydration  Avoid allergens  Avoid excessive caffeine  "

## 2017-11-21 ENCOUNTER — PATIENT MESSAGE (OUTPATIENT)
Dept: FAMILY MEDICINE | Facility: CLINIC | Age: 42
End: 2017-11-21

## 2017-11-21 RX ORDER — ALPRAZOLAM 0.25 MG/1
0.25 TABLET ORAL 2 TIMES DAILY PRN
Qty: 20 TABLET | Refills: 0 | Status: SHIPPED | OUTPATIENT
Start: 2017-11-21 | End: 2018-07-17 | Stop reason: SDUPTHER

## 2017-11-22 ENCOUNTER — PATIENT MESSAGE (OUTPATIENT)
Dept: FAMILY MEDICINE | Facility: CLINIC | Age: 42
End: 2017-11-22

## 2017-11-22 RX ORDER — OSELTAMIVIR PHOSPHATE 75 MG/1
75 CAPSULE ORAL DAILY
Qty: 10 CAPSULE | Refills: 0 | Status: SHIPPED | OUTPATIENT
Start: 2017-11-22 | End: 2017-12-02

## 2017-12-15 ENCOUNTER — PATIENT MESSAGE (OUTPATIENT)
Dept: FAMILY MEDICINE | Facility: CLINIC | Age: 42
End: 2017-12-15

## 2017-12-15 RX ORDER — FLUTICASONE PROPIONATE AND SALMETEROL 250; 50 UG/1; UG/1
POWDER RESPIRATORY (INHALATION)
Qty: 60 EACH | Refills: 2 | Status: SHIPPED | OUTPATIENT
Start: 2017-12-15 | End: 2018-06-18 | Stop reason: SDUPTHER

## 2017-12-15 RX ORDER — PROMETHAZINE HYDROCHLORIDE AND DEXTROMETHORPHAN HYDROBROMIDE 6.25; 15 MG/5ML; MG/5ML
5 SYRUP ORAL
Qty: 240 ML | Refills: 0 | Status: SHIPPED | OUTPATIENT
Start: 2017-12-15 | End: 2017-12-25

## 2018-02-28 ENCOUNTER — PATIENT MESSAGE (OUTPATIENT)
Dept: FAMILY MEDICINE | Facility: CLINIC | Age: 43
End: 2018-02-28

## 2018-03-01 ENCOUNTER — PATIENT MESSAGE (OUTPATIENT)
Dept: FAMILY MEDICINE | Facility: CLINIC | Age: 43
End: 2018-03-01

## 2018-03-02 ENCOUNTER — PATIENT MESSAGE (OUTPATIENT)
Dept: FAMILY MEDICINE | Facility: CLINIC | Age: 43
End: 2018-03-02

## 2018-03-02 RX ORDER — AMOXICILLIN 875 MG/1
875 TABLET, FILM COATED ORAL EVERY 12 HOURS
Qty: 20 TABLET | Refills: 0 | Status: SHIPPED | OUTPATIENT
Start: 2018-03-02 | End: 2018-03-23 | Stop reason: ALTCHOICE

## 2018-03-23 ENCOUNTER — PATIENT MESSAGE (OUTPATIENT)
Dept: FAMILY MEDICINE | Facility: CLINIC | Age: 43
End: 2018-03-23

## 2018-03-23 ENCOUNTER — OFFICE VISIT (OUTPATIENT)
Dept: FAMILY MEDICINE | Facility: CLINIC | Age: 43
End: 2018-03-23
Payer: COMMERCIAL

## 2018-03-23 VITALS
HEIGHT: 63 IN | SYSTOLIC BLOOD PRESSURE: 108 MMHG | WEIGHT: 192.13 LBS | HEART RATE: 88 BPM | BODY MASS INDEX: 34.04 KG/M2 | DIASTOLIC BLOOD PRESSURE: 70 MMHG | RESPIRATION RATE: 18 BRPM | TEMPERATURE: 98 F

## 2018-03-23 DIAGNOSIS — J02.9 SORE THROAT: Primary | ICD-10-CM

## 2018-03-23 DIAGNOSIS — H66.91 RIGHT OTITIS MEDIA, UNSPECIFIED OTITIS MEDIA TYPE: ICD-10-CM

## 2018-03-23 LAB
CTP QC/QA: YES
S PYO RRNA THROAT QL PROBE: NEGATIVE

## 2018-03-23 PROCEDURE — 3078F DIAST BP <80 MM HG: CPT | Mod: CPTII,S$GLB,, | Performed by: NURSE PRACTITIONER

## 2018-03-23 PROCEDURE — 87880 STREP A ASSAY W/OPTIC: CPT | Mod: QW,,, | Performed by: NURSE PRACTITIONER

## 2018-03-23 PROCEDURE — 3074F SYST BP LT 130 MM HG: CPT | Mod: CPTII,S$GLB,, | Performed by: NURSE PRACTITIONER

## 2018-03-23 PROCEDURE — 99214 OFFICE O/P EST MOD 30 MIN: CPT | Mod: S$GLB,,, | Performed by: NURSE PRACTITIONER

## 2018-03-23 RX ORDER — IBUPROFEN 800 MG/1
800 TABLET ORAL EVERY 8 HOURS PRN
Refills: 0 | COMMUNITY
Start: 2018-01-11 | End: 2018-07-12

## 2018-03-23 RX ORDER — CEFDINIR 300 MG/1
300 CAPSULE ORAL 2 TIMES DAILY
Qty: 14 CAPSULE | Refills: 0 | Status: SHIPPED | OUTPATIENT
Start: 2018-03-23 | End: 2018-03-30

## 2018-03-23 RX ORDER — AZELASTINE 1 MG/ML
1 SPRAY, METERED NASAL 2 TIMES DAILY
Qty: 30 ML | Refills: 3 | Status: SHIPPED | OUTPATIENT
Start: 2018-03-23 | End: 2018-08-28 | Stop reason: SDUPTHER

## 2018-03-23 RX ORDER — FLUTICASONE PROPIONATE 50 MCG
2 SPRAY, SUSPENSION (ML) NASAL DAILY
Qty: 16 G | Refills: 2 | Status: SHIPPED | OUTPATIENT
Start: 2018-03-23 | End: 2019-01-28 | Stop reason: SDUPTHER

## 2018-03-23 NOTE — PROGRESS NOTES
"Subjective:       Patient ID: Noni Lazo is a 43 y.o. female.    Chief Complaint: sore throat, head and nasal congestion,    Sore Throat    This is a recurrent problem. The current episode started 1 to 4 weeks ago. The problem has been waxing and waning. Neither side of throat is experiencing more pain than the other. There has been no fever. The fever has been present for 1 to 2 days. The pain is at a severity of 7/10. The pain is moderate. Associated symptoms include congestion, coughing, ear pain, headaches, a hoarse voice, a plugged ear sensation, neck pain, shortness of breath, swollen glands and trouble swallowing. Pertinent negatives include no abdominal pain, diarrhea, drooling, ear discharge, stridor or vomiting. She has had exposure to strep and mono. She has tried NSAIDs, acetaminophen and cool liquids for the symptoms. The treatment provided moderate relief.     Review of Systems   HENT: Positive for congestion, ear pain, hoarse voice, sore throat and trouble swallowing. Negative for drooling and ear discharge.    Respiratory: Positive for cough and shortness of breath. Negative for stridor.    Gastrointestinal: Negative for abdominal pain, diarrhea and vomiting.   Musculoskeletal: Positive for neck pain.   Neurological: Positive for headaches.       Objective:       Vitals:    03/23/18 1555   BP: 108/70   Pulse: 88   Resp: 18   Temp: 98 °F (36.7 °C)   TempSrc: Oral   Weight: 87.1 kg (192 lb 2.1 oz)   Height: 5' 3" (1.6 m)   PainSc:   7   PainLoc: Throat       Physical Exam   Constitutional: She is oriented to person, place, and time. She appears well-developed and well-nourished.   HENT:   Head: Normocephalic and atraumatic.   Right Ear: External ear normal. Tympanic membrane is bulging. Tympanic membrane mobility is abnormal.   Left Ear: External ear normal. Tympanic membrane mobility is abnormal.   Nose: Mucosal edema and rhinorrhea present.   Mouth/Throat: Posterior oropharyngeal edema present. " No posterior oropharyngeal erythema.   Eyes: Conjunctivae are normal. Pupils are equal, round, and reactive to light.   Neck: Normal range of motion. Neck supple. No tracheal deviation present.   Cardiovascular: Normal rate and regular rhythm.  Exam reveals no gallop and no friction rub.    No murmur heard.  Pulmonary/Chest: Effort normal and breath sounds normal. No stridor. No respiratory distress. She has no wheezes. She has no rales.   Musculoskeletal: Normal range of motion.   Lymphadenopathy:     She has no cervical adenopathy.   Neurological: She is alert and oriented to person, place, and time.   Skin: Skin is warm and dry.   Psychiatric: She has a normal mood and affect. Her behavior is normal. Judgment and thought content normal.       Assessment:       1. Sore throat    2. Right otitis media, unspecified otitis media type        Plan:       Noni was seen today for sore throat, head and nasal congestion,.    Diagnoses and all orders for this visit:    Sore throat  -     POCT rapid strep A  Results for orders placed or performed in visit on 03/23/18   POCT rapid strep A   Result Value Ref Range    Rapid Strep A Screen Negative Negative     Acceptable Yes          Right otitis media, unspecified otitis media type    -     cefdinir (OMNICEF) 300 MG capsule; Take 1 capsule (300 mg total) by mouth 2 (two) times daily.  -     azelastine (ASTELIN) 137 mcg (0.1 %) nasal spray; 1 spray (137 mcg total) by Nasal route 2 (two) times daily.  -     fluticasone (FLONASE) 50 mcg/actuation nasal spray; 2 sprays (100 mcg total) by Each Nare route once daily.

## 2018-04-30 ENCOUNTER — PATIENT MESSAGE (OUTPATIENT)
Dept: FAMILY MEDICINE | Facility: CLINIC | Age: 43
End: 2018-04-30

## 2018-05-01 ENCOUNTER — PATIENT MESSAGE (OUTPATIENT)
Dept: FAMILY MEDICINE | Facility: CLINIC | Age: 43
End: 2018-05-01

## 2018-05-01 RX ORDER — AMOXICILLIN 875 MG/1
875 TABLET, FILM COATED ORAL EVERY 12 HOURS
Qty: 20 TABLET | Refills: 0 | Status: SHIPPED | OUTPATIENT
Start: 2018-05-01 | End: 2018-07-12 | Stop reason: ALTCHOICE

## 2018-05-15 ENCOUNTER — PATIENT MESSAGE (OUTPATIENT)
Dept: FAMILY MEDICINE | Facility: CLINIC | Age: 43
End: 2018-05-15

## 2018-05-16 ENCOUNTER — PATIENT MESSAGE (OUTPATIENT)
Dept: FAMILY MEDICINE | Facility: CLINIC | Age: 43
End: 2018-05-16

## 2018-05-16 RX ORDER — NEOMYCIN SULFATE, POLYMYXIN B SULFATE AND DEXAMETHASONE 3.5; 10000; 1 MG/ML; [USP'U]/ML; MG/ML
1 SUSPENSION/ DROPS OPHTHALMIC
Qty: 5 ML | Refills: 0 | Status: SHIPPED | OUTPATIENT
Start: 2018-05-16 | End: 2019-03-18 | Stop reason: ALTCHOICE

## 2018-06-18 RX ORDER — FLUTICASONE PROPIONATE AND SALMETEROL 50; 250 UG/1; UG/1
POWDER RESPIRATORY (INHALATION)
Qty: 60 EACH | Refills: 2 | Status: SHIPPED | OUTPATIENT
Start: 2018-06-18 | End: 2018-08-28 | Stop reason: SDUPTHER

## 2018-06-19 LAB
A1C: 6.7
HUMAN PAPILLOMAVIRUS (HPV): NORMAL
PAP SMEAR: NORMAL

## 2018-06-27 ENCOUNTER — PATIENT MESSAGE (OUTPATIENT)
Dept: FAMILY MEDICINE | Facility: CLINIC | Age: 43
End: 2018-06-27

## 2018-06-27 NOTE — TELEPHONE ENCOUNTER
See [facility] message.    I can request the records from Dr Kearns office, but her last OV was 3/23/18 for a sore throat and has cancelled 2 recent appointments with you without rescheduling (she had one for today).

## 2018-07-12 ENCOUNTER — OFFICE VISIT (OUTPATIENT)
Dept: FAMILY MEDICINE | Facility: CLINIC | Age: 43
End: 2018-07-12
Payer: COMMERCIAL

## 2018-07-12 VITALS
RESPIRATION RATE: 16 BRPM | DIASTOLIC BLOOD PRESSURE: 82 MMHG | TEMPERATURE: 98 F | SYSTOLIC BLOOD PRESSURE: 114 MMHG | HEART RATE: 92 BPM | HEIGHT: 63 IN | OXYGEN SATURATION: 99 % | BODY MASS INDEX: 34.91 KG/M2 | WEIGHT: 197 LBS

## 2018-07-12 DIAGNOSIS — J30.9 ALLERGIC RHINITIS, UNSPECIFIED SEASONALITY, UNSPECIFIED TRIGGER: ICD-10-CM

## 2018-07-12 DIAGNOSIS — E04.2 MULTIPLE THYROID NODULES: ICD-10-CM

## 2018-07-12 DIAGNOSIS — J01.00 ACUTE MAXILLARY SINUSITIS, RECURRENCE NOT SPECIFIED: Primary | ICD-10-CM

## 2018-07-12 PROCEDURE — 3008F BODY MASS INDEX DOCD: CPT | Mod: CPTII,S$GLB,, | Performed by: NURSE PRACTITIONER

## 2018-07-12 PROCEDURE — 99214 OFFICE O/P EST MOD 30 MIN: CPT | Mod: S$GLB,,, | Performed by: NURSE PRACTITIONER

## 2018-07-12 PROCEDURE — 3074F SYST BP LT 130 MM HG: CPT | Mod: CPTII,S$GLB,, | Performed by: NURSE PRACTITIONER

## 2018-07-12 PROCEDURE — 3079F DIAST BP 80-89 MM HG: CPT | Mod: CPTII,S$GLB,, | Performed by: NURSE PRACTITIONER

## 2018-07-12 RX ORDER — METFORMIN HYDROCHLORIDE 500 MG/1
500 TABLET ORAL DAILY
Refills: 3 | COMMUNITY
Start: 2018-06-26 | End: 2020-10-27 | Stop reason: SDUPTHER

## 2018-07-12 RX ORDER — AMOXICILLIN 875 MG/1
875 TABLET, FILM COATED ORAL EVERY 12 HOURS
Qty: 20 TABLET | Refills: 0 | Status: SHIPPED | OUTPATIENT
Start: 2018-07-12 | End: 2018-11-15

## 2018-07-12 RX ORDER — PROGESTERONE 200 MG/1
CAPSULE ORAL
Refills: 0 | COMMUNITY
Start: 2018-06-19 | End: 2019-03-18

## 2018-07-12 NOTE — PROGRESS NOTES
Answers for HPI/ROS submitted by the patient on 7/11/2018   Sore throat  Chronicity: new  Onset: in the past 7 days  Progression since onset: gradually improving  Pain worse on: left  Fever: no fever  Fever duration: less than 1 day  Pain - numeric: 2/10  abdominal pain: No  congestion: Yes  cough: Yes  diarrhea: No  drooling: No  ear discharge: No  ear pain: Yes  headaches: Yes  hoarse voice: Yes  neck pain: No  plugged ear sensation: Yes  shortness of breath: No  stridor: Yes  swollen glands: No  trouble swallowing: Yes  vomiting: No  strep: No  mono: No  Treatments tried: NSAIDs, cool liquids  Improvement on treatment: mild  Pain severity: mild

## 2018-07-12 NOTE — PROGRESS NOTES
Subjective:       Patient ID: Noni Lazo is a 43 y.o. female.    Chief Complaint: Sinus Problem (post nasal drip) and Sore Throat    HPI Answers for HPI/ROS submitted by the patient on 7/11/2018   Sore throat  Chronicity: new  Onset: in the past 7 days  Progression since onset: gradually improving  Pain worse on: left  Fever: no fever  Fever duration: less than 1 day  Pain - numeric: 2/10  abdominal pain: No  congestion: Yes  cough: Yes  diarrhea: No  drooling: No  ear discharge: No  ear pain: Yes  headaches: Yes  hoarse voice: Yes  neck pain: No  plugged ear sensation: Yes  shortness of breath: No  stridor: Yes  swollen glands: No  trouble swallowing: Yes  vomiting: No  strep: No  mono: No  Treatments tried: NSAIDs, cool liquids  Improvement on treatment: mild  Pain severity: mild    Onset over the past 3 week. Sore throat, ear pressure. Throat feels raw, swollen. Coughing. Not sleeping well due to cough. Lots of sinus area pressure. She has issues with chronic allergies. Taking Motrin and drinking liquids. She has history of multiple thyroid nodules, due for repeat US. See ROS.    The following portion of the patients history was reviewed and updated as appropriate: allergies, current medications, past medical and surgical history. Past social history and problem list reviewed. Family PMH and Past social history reviewed. Tobacco, Illicit drug use reviewed.     Review of Systems   Constitutional: Positive for fatigue. Negative for fever.   HENT: Positive for congestion, ear pain (pressure), sinus pain, sinus pressure, sore throat and trouble swallowing. Negative for drooling and ear discharge.    Eyes: Negative for itching and visual disturbance.   Respiratory: Positive for cough and chest tightness. Negative for shortness of breath, wheezing and stridor.    Cardiovascular: Negative for chest pain, palpitations and leg swelling.   Gastrointestinal: Negative for abdominal pain, blood in stool, constipation,  "diarrhea, nausea and vomiting.   Musculoskeletal: Negative for gait problem and neck pain.   Neurological: Positive for headaches (from sinus pressure).       Objective:     /82   Pulse 92   Temp 98.4 °F (36.9 °C) (Oral)   Resp 16   Ht 5' 3" (1.6 m)   Wt 89.4 kg (197 lb)   SpO2 99%   BMI 34.90 kg/m²      Physical Exam  Constitutional:  well-developed and well-nourished. Oriented to Person, place and time.  Head: Normocephalic.   Ears: Canals without erythema or cerumen impactions. Mild air and /fluid levels. No bulging bilaterally.  Nose: Rhinorrhea and sinus tenderness present   With fullness over maxillary sinus area   Mouth/Throat: Uvula is midline and mucous membranes are normal. Posterior oropharyngeal erythema present. PND to posterior pharynx. No exudate.  Eyes: Conjunctivae are normal. Pupils are equal, round, and reactive to light.   Neck: Normal range of motion. Neck supple. mild inflammation and tenderness to anterior cervical lymph nodes.  Enlarged thyroid.  Cardiovascular: Normal rate and regular rhythm.  No murmurs or gallops.   Pulmonary/Chest: Effort normal and breath sounds normal.  no wheezing or rales.   Musculoskeletal: Normal range of motion. gait and coordination normal.   Assessment:       1. Acute maxillary sinusitis, recurrence not specified    2. Multiple thyroid nodules    3. Allergic rhinitis, unspecified seasonality, unspecified trigger        Plan:         Noni was seen today for sinus problem and sore throat.    Diagnoses and all orders for this visit:    Acute maxillary sinusitis, recurrence not specified:  Due to duration and presenting exam will cover with antibiotics. Take as directed. Complete full course of medication.    Multiple thyroid nodules: due for repeat US.   -     US Soft Tissue Head Neck Thyroid; Future    Allergic rhinitis, unspecified seasonality, unspecified trigger: she needs to be on flonase and claritin daily all the time.    Other orders  -     " amoxicillin (AMOXIL) 875 MG tablet; Take 1 tablet (875 mg total) by mouth every 12 (twelve) hours.    Continue current medication  Take medications only as prescribed  Healthy diet  Adequate rest  Adequate hydration  Avoid allergens  Avoid excessive caffeine

## 2018-07-13 ENCOUNTER — PATIENT MESSAGE (OUTPATIENT)
Dept: FAMILY MEDICINE | Facility: CLINIC | Age: 43
End: 2018-07-13

## 2018-07-16 RX ORDER — AMLODIPINE BESYLATE 10 MG/1
10 TABLET ORAL DAILY
Qty: 90 TABLET | Refills: 2 | Status: SHIPPED | OUTPATIENT
Start: 2018-07-16 | End: 2019-06-26 | Stop reason: SDUPTHER

## 2018-07-17 ENCOUNTER — PATIENT MESSAGE (OUTPATIENT)
Dept: FAMILY MEDICINE | Facility: CLINIC | Age: 43
End: 2018-07-17

## 2018-07-17 DIAGNOSIS — Z00.00 LABORATORY EXAM ORDERED AS PART OF ROUTINE GENERAL MEDICAL EXAMINATION: Primary | ICD-10-CM

## 2018-07-17 RX ORDER — ALPRAZOLAM 0.25 MG/1
0.25 TABLET ORAL 2 TIMES DAILY PRN
Qty: 20 TABLET | Refills: 0 | Status: SHIPPED | OUTPATIENT
Start: 2018-07-17 | End: 2019-03-18

## 2018-07-17 RX ORDER — ERGOCALCIFEROL 1.25 MG/1
50000 CAPSULE ORAL
Qty: 12 CAPSULE | Refills: 3 | Status: SHIPPED | OUTPATIENT
Start: 2018-07-17 | End: 2019-07-25 | Stop reason: SDUPTHER

## 2018-07-17 RX ORDER — ROSUVASTATIN CALCIUM 10 MG/1
10 TABLET, COATED ORAL DAILY
Qty: 90 TABLET | Refills: 3 | Status: SHIPPED | OUTPATIENT
Start: 2018-07-17 | End: 2018-07-31 | Stop reason: SDUPTHER

## 2018-07-17 RX ORDER — ESOMEPRAZOLE MAGNESIUM 40 MG/1
40 CAPSULE, DELAYED RELEASE ORAL
Qty: 90 CAPSULE | Refills: 2 | Status: SHIPPED | OUTPATIENT
Start: 2018-07-17 | End: 2018-08-28 | Stop reason: SDUPTHER

## 2018-07-17 NOTE — TELEPHONE ENCOUNTER
----- Message from Batool Blackburn NP sent at 7/17/2018  2:03 PM CDT -----  I reviewed her labs from Dr. Barrera. She had HgbA1c of 6.7.  That is indicative of diabetes. She was started on Metformin 500mg daily. Important to take that and follow low fat, low carb diabetic diet. She also had low Vitamin D level at 11. Goal is around  20. I sent vitamin D supplement to the pharmacy. Take one tablet weekly. She also needs to be on low dose statin to give her renal protection with the diabetes. That was sent to pharmacy. She needs to have lipid panel done. Orders placed, please ask her to do that. Fasting at least 8 hours before having labs done.

## 2018-07-21 ENCOUNTER — TELEPHONE (OUTPATIENT)
Dept: ADMINISTRATIVE | Facility: HOSPITAL | Age: 43
End: 2018-07-21

## 2018-07-21 NOTE — TELEPHONE ENCOUNTER
[facility], scanned into media.      Pap and lab report, eye exam

## 2018-07-30 ENCOUNTER — LAB VISIT (OUTPATIENT)
Dept: LAB | Facility: HOSPITAL | Age: 43
End: 2018-07-30
Attending: NURSE PRACTITIONER
Payer: COMMERCIAL

## 2018-07-30 ENCOUNTER — PATIENT MESSAGE (OUTPATIENT)
Dept: FAMILY MEDICINE | Facility: CLINIC | Age: 43
End: 2018-07-30

## 2018-07-30 DIAGNOSIS — G47.30 SLEEP APNEA, UNSPECIFIED TYPE: Primary | ICD-10-CM

## 2018-07-30 DIAGNOSIS — Z00.00 LABORATORY EXAM ORDERED AS PART OF ROUTINE GENERAL MEDICAL EXAMINATION: ICD-10-CM

## 2018-07-30 LAB
CHOLEST SERPL-MCNC: 224 MG/DL
CHOLEST/HDLC SERPL: 4.3 {RATIO}
HDLC SERPL-MCNC: 52 MG/DL
HDLC SERPL: 23.2 %
LDLC SERPL CALC-MCNC: 146.2 MG/DL
NONHDLC SERPL-MCNC: 172 MG/DL
TRIGL SERPL-MCNC: 129 MG/DL

## 2018-07-30 PROCEDURE — 36415 COLL VENOUS BLD VENIPUNCTURE: CPT | Mod: PO

## 2018-07-30 PROCEDURE — 80061 LIPID PANEL: CPT

## 2018-07-31 ENCOUNTER — PATIENT MESSAGE (OUTPATIENT)
Dept: FAMILY MEDICINE | Facility: CLINIC | Age: 43
End: 2018-07-31

## 2018-07-31 DIAGNOSIS — Z00.00 LABORATORY EXAMINATION ORDERED AS PART OF A ROUTINE GENERAL MEDICAL EXAMINATION: Primary | ICD-10-CM

## 2018-07-31 RX ORDER — ALBUTEROL SULFATE 90 UG/1
2 AEROSOL, METERED RESPIRATORY (INHALATION) EVERY 6 HOURS PRN
Qty: 18 G | Refills: 0 | Status: SHIPPED | OUTPATIENT
Start: 2018-07-31 | End: 2018-08-24 | Stop reason: SDUPTHER

## 2018-07-31 RX ORDER — ROSUVASTATIN CALCIUM 20 MG/1
20 TABLET, COATED ORAL DAILY
Qty: 90 TABLET | Refills: 3 | Status: SHIPPED | OUTPATIENT
Start: 2018-07-31 | End: 2018-09-10 | Stop reason: SINTOL

## 2018-07-31 NOTE — TELEPHONE ENCOUNTER
Her cholesterol is not to goal. Her LDL is worse at 146, goal is less than 100.  Triglycerides are alright at 129. Is she taking the crestor every day?  Need to increase the dose to 20mg and she must take this every day. Follow low fat, low carb diabetic diet. Exercise. Repeat lipid in one month. Orders placed.  Higher dose of medication sent to pharmacy. She can take two of what she has at home.

## 2018-07-31 NOTE — TELEPHONE ENCOUNTER
Lab ordered by NP incorrectly linked to rescheduled appointment. Additional lipid panel entered, appt scheduled, patient notified via My Chart message.

## 2018-08-24 RX ORDER — ALBUTEROL SULFATE 90 UG/1
AEROSOL, METERED RESPIRATORY (INHALATION)
Qty: 18 INHALER | Refills: 0 | Status: SHIPPED | OUTPATIENT
Start: 2018-08-24 | End: 2019-03-29 | Stop reason: SDUPTHER

## 2018-08-28 DIAGNOSIS — J45.909 ASTHMA, UNSPECIFIED ASTHMA SEVERITY, UNSPECIFIED WHETHER COMPLICATED, UNSPECIFIED WHETHER PERSISTENT: ICD-10-CM

## 2018-08-28 DIAGNOSIS — K21.00 REFLUX ESOPHAGITIS: Primary | ICD-10-CM

## 2018-08-28 RX ORDER — FLUTICASONE PROPIONATE AND SALMETEROL 250; 50 UG/1; UG/1
1 POWDER RESPIRATORY (INHALATION) 2 TIMES DAILY
Qty: 60 EACH | Refills: 2 | Status: SHIPPED | OUTPATIENT
Start: 2018-08-28 | End: 2019-03-18 | Stop reason: SDUPTHER

## 2018-08-28 RX ORDER — AZELASTINE 1 MG/ML
1 SPRAY, METERED NASAL 2 TIMES DAILY
Qty: 30 ML | Refills: 3 | Status: SHIPPED | OUTPATIENT
Start: 2018-08-28 | End: 2019-03-18 | Stop reason: SDUPTHER

## 2018-08-28 RX ORDER — ESOMEPRAZOLE MAGNESIUM 40 MG/1
40 CAPSULE, DELAYED RELEASE ORAL
Qty: 90 CAPSULE | Refills: 2 | Status: SHIPPED | OUTPATIENT
Start: 2018-08-28 | End: 2020-08-10

## 2018-08-31 ENCOUNTER — PATIENT MESSAGE (OUTPATIENT)
Dept: FAMILY MEDICINE | Facility: CLINIC | Age: 43
End: 2018-08-31

## 2018-09-05 RX ORDER — PROPRANOLOL HYDROCHLORIDE 160 MG/1
160 CAPSULE, EXTENDED RELEASE ORAL DAILY
Qty: 90 CAPSULE | Refills: 2 | Status: SHIPPED | OUTPATIENT
Start: 2018-09-05 | End: 2019-06-28 | Stop reason: SDUPTHER

## 2018-09-08 ENCOUNTER — LAB VISIT (OUTPATIENT)
Dept: LAB | Facility: HOSPITAL | Age: 43
End: 2018-09-08
Attending: NURSE PRACTITIONER
Payer: COMMERCIAL

## 2018-09-08 DIAGNOSIS — Z00.00 LABORATORY EXAMINATION ORDERED AS PART OF A ROUTINE GENERAL MEDICAL EXAMINATION: ICD-10-CM

## 2018-09-08 LAB
CHOLEST SERPL-MCNC: 225 MG/DL
CHOLEST/HDLC SERPL: 4.2 {RATIO}
HDLC SERPL-MCNC: 54 MG/DL
HDLC SERPL: 24 %
LDLC SERPL CALC-MCNC: 148.2 MG/DL
NONHDLC SERPL-MCNC: 171 MG/DL
TRIGL SERPL-MCNC: 114 MG/DL

## 2018-09-08 PROCEDURE — 80061 LIPID PANEL: CPT

## 2018-09-08 PROCEDURE — 36415 COLL VENOUS BLD VENIPUNCTURE: CPT | Mod: PO

## 2018-09-09 ENCOUNTER — PATIENT MESSAGE (OUTPATIENT)
Dept: FAMILY MEDICINE | Facility: CLINIC | Age: 43
End: 2018-09-09

## 2018-09-10 RX ORDER — ATORVASTATIN CALCIUM 20 MG/1
20 TABLET, FILM COATED ORAL DAILY
Qty: 90 TABLET | Refills: 3 | Status: SHIPPED | OUTPATIENT
Start: 2018-09-10 | End: 2019-06-28 | Stop reason: SDUPTHER

## 2018-09-11 ENCOUNTER — TELEPHONE (OUTPATIENT)
Dept: FAMILY MEDICINE | Facility: CLINIC | Age: 43
End: 2018-09-11

## 2018-09-11 NOTE — TELEPHONE ENCOUNTER
----- Message from Deng Toney sent at 9/11/2018  4:39 PM CDT -----  Type:  Patient Returning Call    Who Called:  Patient  Who Left Message for Patient:  NA  Call was not documented, not sure if this is the office that called  Does the patient know what this is regarding?:  GERHARD    Best Call Back Number:  [phone]  Additional Information:

## 2018-10-27 ENCOUNTER — PATIENT MESSAGE (OUTPATIENT)
Dept: FAMILY MEDICINE | Facility: CLINIC | Age: 43
End: 2018-10-27

## 2018-10-27 DIAGNOSIS — E78.2 MIXED HYPERLIPIDEMIA: ICD-10-CM

## 2018-10-27 DIAGNOSIS — E11.8 TYPE 2 DIABETES MELLITUS WITH COMPLICATION, WITHOUT LONG-TERM CURRENT USE OF INSULIN: Primary | ICD-10-CM

## 2018-10-27 DIAGNOSIS — E55.9 VITAMIN D DEFICIENCY: ICD-10-CM

## 2018-10-29 ENCOUNTER — PATIENT MESSAGE (OUTPATIENT)
Dept: FAMILY MEDICINE | Facility: CLINIC | Age: 43
End: 2018-10-29

## 2018-10-29 RX ORDER — TRIAMTERENE AND HYDROCHLOROTHIAZIDE 37.5; 25 MG/1; MG/1
1 CAPSULE ORAL DAILY
Qty: 90 CAPSULE | Refills: 2 | Status: SHIPPED | OUTPATIENT
Start: 2018-10-29 | End: 2019-06-28 | Stop reason: SDUPTHER

## 2018-10-29 NOTE — TELEPHONE ENCOUNTER
Please review e-mail message. Pt requesting labs. Orders pended. Please review and advise thanks-NYASIA

## 2018-11-03 ENCOUNTER — LAB VISIT (OUTPATIENT)
Dept: LAB | Facility: HOSPITAL | Age: 43
End: 2018-11-03
Attending: NURSE PRACTITIONER
Payer: COMMERCIAL

## 2018-11-03 DIAGNOSIS — E78.2 MIXED HYPERLIPIDEMIA: ICD-10-CM

## 2018-11-03 DIAGNOSIS — E55.9 VITAMIN D DEFICIENCY: ICD-10-CM

## 2018-11-03 DIAGNOSIS — E11.8 TYPE 2 DIABETES MELLITUS WITH COMPLICATION, WITHOUT LONG-TERM CURRENT USE OF INSULIN: ICD-10-CM

## 2018-11-03 LAB
25(OH)D3+25(OH)D2 SERPL-MCNC: 22 NG/ML
ALBUMIN SERPL BCP-MCNC: 3.9 G/DL
ALP SERPL-CCNC: 72 U/L
ALT SERPL W/O P-5'-P-CCNC: 45 U/L
ANION GAP SERPL CALC-SCNC: 9 MMOL/L
AST SERPL-CCNC: 31 U/L
BASOPHILS # BLD AUTO: 0.05 K/UL
BASOPHILS NFR BLD: 0.9 %
BILIRUB SERPL-MCNC: 0.4 MG/DL
BUN SERPL-MCNC: 9 MG/DL
CALCIUM SERPL-MCNC: 10.1 MG/DL
CHLORIDE SERPL-SCNC: 99 MMOL/L
CHOLEST SERPL-MCNC: 172 MG/DL
CHOLEST/HDLC SERPL: 3.4 {RATIO}
CO2 SERPL-SCNC: 31 MMOL/L
CREAT SERPL-MCNC: 0.9 MG/DL
DIFFERENTIAL METHOD: ABNORMAL
EOSINOPHIL # BLD AUTO: 0.2 K/UL
EOSINOPHIL NFR BLD: 3.8 %
ERYTHROCYTE [DISTWIDTH] IN BLOOD BY AUTOMATED COUNT: 14.2 %
EST. GFR  (AFRICAN AMERICAN): >60 ML/MIN/1.73 M^2
EST. GFR  (NON AFRICAN AMERICAN): >60 ML/MIN/1.73 M^2
ESTIMATED AVG GLUCOSE: 131 MG/DL
GLUCOSE SERPL-MCNC: 114 MG/DL
HBA1C MFR BLD HPLC: 6.2 %
HCT VFR BLD AUTO: 42.5 %
HDLC SERPL-MCNC: 51 MG/DL
HDLC SERPL: 29.7 %
HGB BLD-MCNC: 13.3 G/DL
IMM GRANULOCYTES # BLD AUTO: 0.01 K/UL
IMM GRANULOCYTES NFR BLD AUTO: 0.2 %
LDLC SERPL CALC-MCNC: 104.8 MG/DL
LYMPHOCYTES # BLD AUTO: 1.9 K/UL
LYMPHOCYTES NFR BLD: 33.5 %
MCH RBC QN AUTO: 26.5 PG
MCHC RBC AUTO-ENTMCNC: 31.3 G/DL
MCV RBC AUTO: 85 FL
MONOCYTES # BLD AUTO: 0.5 K/UL
MONOCYTES NFR BLD: 7.8 %
NEUTROPHILS # BLD AUTO: 3.1 K/UL
NEUTROPHILS NFR BLD: 53.8 %
NONHDLC SERPL-MCNC: 121 MG/DL
NRBC BLD-RTO: 0 /100 WBC
PLATELET # BLD AUTO: 413 K/UL
PMV BLD AUTO: 10.9 FL
POTASSIUM SERPL-SCNC: 3.4 MMOL/L
PROT SERPL-MCNC: 7.7 G/DL
RBC # BLD AUTO: 5.01 M/UL
SODIUM SERPL-SCNC: 139 MMOL/L
TRIGL SERPL-MCNC: 81 MG/DL
WBC # BLD AUTO: 5.76 K/UL

## 2018-11-03 PROCEDURE — 82306 VITAMIN D 25 HYDROXY: CPT

## 2018-11-03 PROCEDURE — 83036 HEMOGLOBIN GLYCOSYLATED A1C: CPT

## 2018-11-03 PROCEDURE — 80053 COMPREHEN METABOLIC PANEL: CPT

## 2018-11-03 PROCEDURE — 85025 COMPLETE CBC W/AUTO DIFF WBC: CPT

## 2018-11-03 PROCEDURE — 80061 LIPID PANEL: CPT

## 2018-11-03 PROCEDURE — 36415 COLL VENOUS BLD VENIPUNCTURE: CPT | Mod: PO

## 2018-11-14 ENCOUNTER — PATIENT MESSAGE (OUTPATIENT)
Dept: FAMILY MEDICINE | Facility: CLINIC | Age: 43
End: 2018-11-14

## 2018-11-15 ENCOUNTER — PATIENT MESSAGE (OUTPATIENT)
Dept: FAMILY MEDICINE | Facility: CLINIC | Age: 43
End: 2018-11-15

## 2018-11-15 RX ORDER — AMOXICILLIN AND CLAVULANATE POTASSIUM 500; 125 MG/1; MG/1
1 TABLET, FILM COATED ORAL 2 TIMES DAILY
Qty: 20 TABLET | Refills: 0 | Status: SHIPPED | OUTPATIENT
Start: 2018-11-15 | End: 2019-03-18

## 2019-01-28 ENCOUNTER — PATIENT MESSAGE (OUTPATIENT)
Dept: FAMILY MEDICINE | Facility: CLINIC | Age: 44
End: 2019-01-28

## 2019-01-28 RX ORDER — FLUTICASONE PROPIONATE 50 MCG
2 SPRAY, SUSPENSION (ML) NASAL DAILY
Qty: 16 G | Refills: 2 | Status: SHIPPED | OUTPATIENT
Start: 2019-01-28 | End: 2021-07-17 | Stop reason: SDUPTHER

## 2019-01-28 NOTE — TELEPHONE ENCOUNTER
You are on doxycycline now? You should not have an infections if she has been on this medication. I've not seen her since July and I've called in multiple antibiotics for her since then. She needs office visit.

## 2019-01-28 NOTE — TELEPHONE ENCOUNTER
This is message 1 of 2 from pt.  Do you want her to come in or can you call her something in she wants to know.

## 2019-03-18 ENCOUNTER — OFFICE VISIT (OUTPATIENT)
Dept: PRIMARY CARE CLINIC | Facility: CLINIC | Age: 44
End: 2019-03-18
Payer: COMMERCIAL

## 2019-03-18 VITALS
BODY MASS INDEX: 33.4 KG/M2 | DIASTOLIC BLOOD PRESSURE: 64 MMHG | SYSTOLIC BLOOD PRESSURE: 100 MMHG | TEMPERATURE: 98 F | WEIGHT: 188.5 LBS | OXYGEN SATURATION: 97 % | HEIGHT: 63 IN | HEART RATE: 85 BPM

## 2019-03-18 DIAGNOSIS — J30.2 SEASONAL ALLERGIC RHINITIS, UNSPECIFIED TRIGGER: Primary | ICD-10-CM

## 2019-03-18 DIAGNOSIS — J45.909 ASTHMA, UNSPECIFIED ASTHMA SEVERITY, UNSPECIFIED WHETHER COMPLICATED, UNSPECIFIED WHETHER PERSISTENT: ICD-10-CM

## 2019-03-18 PROCEDURE — 3078F DIAST BP <80 MM HG: CPT | Mod: CPTII,S$GLB,, | Performed by: NURSE PRACTITIONER

## 2019-03-18 PROCEDURE — 99214 OFFICE O/P EST MOD 30 MIN: CPT | Mod: S$GLB,,, | Performed by: NURSE PRACTITIONER

## 2019-03-18 PROCEDURE — 3008F BODY MASS INDEX DOCD: CPT | Mod: CPTII,S$GLB,, | Performed by: NURSE PRACTITIONER

## 2019-03-18 PROCEDURE — 3074F PR MOST RECENT SYSTOLIC BLOOD PRESSURE < 130 MM HG: ICD-10-PCS | Mod: CPTII,S$GLB,, | Performed by: NURSE PRACTITIONER

## 2019-03-18 PROCEDURE — 99214 PR OFFICE/OUTPT VISIT, EST, LEVL IV, 30-39 MIN: ICD-10-PCS | Mod: S$GLB,,, | Performed by: NURSE PRACTITIONER

## 2019-03-18 PROCEDURE — 3074F SYST BP LT 130 MM HG: CPT | Mod: CPTII,S$GLB,, | Performed by: NURSE PRACTITIONER

## 2019-03-18 PROCEDURE — 3078F PR MOST RECENT DIASTOLIC BLOOD PRESSURE < 80 MM HG: ICD-10-PCS | Mod: CPTII,S$GLB,, | Performed by: NURSE PRACTITIONER

## 2019-03-18 PROCEDURE — 3008F PR BODY MASS INDEX (BMI) DOCUMENTED: ICD-10-PCS | Mod: CPTII,S$GLB,, | Performed by: NURSE PRACTITIONER

## 2019-03-18 RX ORDER — AZELASTINE 1 MG/ML
1 SPRAY, METERED NASAL 2 TIMES DAILY
Qty: 30 ML | Refills: 5 | Status: SHIPPED | OUTPATIENT
Start: 2019-03-18 | End: 2020-10-27 | Stop reason: SDUPTHER

## 2019-03-18 RX ORDER — LEVOCETIRIZINE DIHYDROCHLORIDE 5 MG/1
5 TABLET, FILM COATED ORAL NIGHTLY
Qty: 30 TABLET | Refills: 5 | Status: SHIPPED | OUTPATIENT
Start: 2019-03-18 | End: 2020-08-10

## 2019-03-18 RX ORDER — FLUTICASONE PROPIONATE AND SALMETEROL 250; 50 UG/1; UG/1
1 POWDER RESPIRATORY (INHALATION) 2 TIMES DAILY
Qty: 60 EACH | Refills: 3 | Status: SHIPPED | OUTPATIENT
Start: 2019-03-18 | End: 2020-03-17 | Stop reason: SDUPTHER

## 2019-03-18 RX ORDER — BENZONATATE 200 MG/1
CAPSULE ORAL
Refills: 0 | COMMUNITY
Start: 2019-01-14 | End: 2019-03-18

## 2019-03-18 NOTE — PATIENT INSTRUCTIONS
Saline nasal spray in shower nightly  Check car and home air filter  Diabetic tussin over the counter  If Xyzal is too expensive get generic claritin over the counter and take daily (Loratadine); without decongestant

## 2019-03-18 NOTE — PROGRESS NOTES
"Subjective:       Patient ID: Noni Lazo is a 44 y.o. female.    Chief Complaint: Chest Congestion; Cough; and sinus pressure  Last seen in primary care by ANGIE Blackburn on 07/12/2018.  This is her first time seeing me in the clinic.  Cough   The current episode started in the past 7 days (3 days). The problem has been gradually worsening. The problem occurs every few minutes. The cough is productive of sputum. Associated symptoms include nasal congestion. Associated symptoms comments: Post nasal drip. Treatments tried: guafenesin, flonase, astelin. The treatment provided mild relief.      She is here today with complaints above that started 3 days ago.  She is having productive cough that alternates with dry hacking cough.   No fever, sinus pressure regularly. States flares about twice yearly.  Was treated in January with antibiotics for a sinus infection; she was treated again in January with antibiotics for URI.  Vitals:    03/18/19 0940   BP: 100/64   Pulse: 85   Temp: 98.1 °F (36.7 °C)     Review of Systems   Respiratory: Positive for cough.        States felt like being more depressed and SOB when taking singulair  States felt less depressed when off singulair  States hears wheezing when she has "flair up with sinus and allergies".    Objective:      Physical Exam   Constitutional: She is oriented to person, place, and time. Vital signs are normal. She appears well-developed and well-nourished. She is active and cooperative.   HENT:   Head: Normocephalic and atraumatic.   Right Ear: Hearing, tympanic membrane, external ear and ear canal normal.   Left Ear: Hearing, tympanic membrane, external ear and ear canal normal.   Nose: Mucosal edema present. Right sinus exhibits no maxillary sinus tenderness and no frontal sinus tenderness. Left sinus exhibits no maxillary sinus tenderness and no frontal sinus tenderness.       Mouth/Throat: Uvula is midline, oropharynx is clear and moist and mucous membranes are " normal.   Noticeable dark nasal creasing   Eyes: Conjunctivae, EOM and lids are normal.       Darkened around eyes bilaterally   Neck: Trachea normal, normal range of motion, full passive range of motion without pain and phonation normal. Neck supple.   Cardiovascular: Normal rate, regular rhythm and normal heart sounds.   Pulmonary/Chest: Effort normal and breath sounds normal.   Abdominal: Soft. Bowel sounds are normal.   Musculoskeletal: Normal range of motion.   Lymphadenopathy:        Head (right side): No submental, no submandibular, no tonsillar, no preauricular, no posterior auricular and no occipital adenopathy present.        Head (left side): No submental, no submandibular, no tonsillar, no preauricular, no posterior auricular and no occipital adenopathy present.     She has no cervical adenopathy.   Neurological: She is alert and oriented to person, place, and time.   Skin: Skin is warm, dry and intact.   Psychiatric: She has a normal mood and affect. Her speech is normal and behavior is normal. Judgment and thought content normal. Cognition and memory are normal.   Nursing note and vitals reviewed.      Assessment & Plan:       Seasonal allergic rhinitis, unspecified trigger  -     azelastine (ASTELIN) 137 mcg (0.1 %) nasal spray; 1 spray (137 mcg total) by Nasal route 2 (two) times daily.  Dispense: 30 mL; Refill: 5  -     levocetirizine (XYZAL) 5 MG tablet; Take 1 tablet (5 mg total) by mouth every evening.  Dispense: 30 tablet; Refill: 5    Asthma, unspecified asthma severity, unspecified whether complicated, unspecified whether persistent  -     fluticasone-salmeterol diskus inhaler 250-50 mcg; Inhale 1 puff into the lungs 2 (two) times daily. Controller  Dispense: 60 each; Refill: 3    Saline nasal spray in shower nightly  Check car and home air filter  Diabetic tussin over the counter  If Xyzal is too expensive get generic claritin over the counter and take daily (Loratadine); without  decongestant    Medication List with Changes/Refills   New Medications    LEVOCETIRIZINE (XYZAL) 5 MG TABLET    Take 1 tablet (5 mg total) by mouth every evening.   Current Medications    ACETAMINOPHEN (TYLENOL) 500 MG TABLET    Take 2 tablets by mouth Once daily as needed.    AMLODIPINE (NORVASC) 10 MG TABLET    TAKE 1 TABLET (10 MG TOTAL) BY MOUTH ONCE DAILY.    ATORVASTATIN (LIPITOR) 20 MG TABLET    Take 1 tablet (20 mg total) by mouth once daily.    BLOOD SUGAR DIAGNOSTIC STRP    For daily and prn checks.  True Matrix meter.    ERGOCALCIFEROL (ERGOCALCIFEROL) 50,000 UNIT CAP    Take 1 capsule (50,000 Units total) by mouth every 7 days.    ESOMEPRAZOLE (NEXIUM) 40 MG CAPSULE    Take 1 capsule (40 mg total) by mouth before breakfast.    FLUTICASONE (FLONASE) 50 MCG/ACTUATION NASAL SPRAY    2 sprays (100 mcg total) by Each Nare route once daily.    IBUPROFEN (ADVIL,MOTRIN) 200 MG TABLET    Take 200 mg by mouth every 6 (six) hours as needed.      METFORMIN (GLUCOPHAGE) 500 MG TABLET    Take 500 mg by mouth once daily.    MUPIROCIN (BACTROBAN) 2 % OINTMENT    Apply topically as needed.    PROPRANOLOL (INDERAL LA) 160 MG 24 HR CAPSULE    TAKE 1 CAPSULE (160 MG TOTAL) BY MOUTH ONCE DAILY.    TRIAMTERENE-HYDROCHLOROTHIAZIDE 37.5-25 MG (DYAZIDE) 37.5-25 MG PER CAPSULE    TAKE 1 CAPSULE BY MOUTH ONCE DAILY.    VENTOLIN HFA 90 MCG/ACTUATION INHALER    INHALE 2 PUFFS INTO THE LUNGS EVERY 6 (SIX) HOURS AS NEEDED FOR WHEEZING. RESCUE   Changed and/or Refilled Medications    Modified Medication Previous Medication    AZELASTINE (ASTELIN) 137 MCG (0.1 %) NASAL SPRAY azelastine (ASTELIN) 137 mcg (0.1 %) nasal spray       1 spray (137 mcg total) by Nasal route 2 (two) times daily.    1 spray (137 mcg total) by Nasal route 2 (two) times daily.    FLUTICASONE-SALMETEROL DISKUS INHALER 250-50 MCG fluticasone-salmeterol 250-50 mcg/dose (ADVAIR DISKUS) 250-50 mcg/dose diskus inhaler       Inhale 1 puff into the lungs 2 (two) times  daily. Controller    Inhale 1 puff into the lungs 2 (two) times daily. Controller   Discontinued Medications    ALPRAZOLAM (XANAX) 0.25 MG TABLET    Take 1 tablet (0.25 mg total) by mouth 2 (two) times daily as needed for Anxiety.    AMOXICILLIN-CLAVULANATE 500-125MG (AUGMENTIN) 500-125 MG TAB    Take 1 tablet (500 mg total) by mouth 2 (two) times daily.    BENZONATATE (TESSALON) 200 MG CAPSULE    TAKE 1 CAPSULE BY MOUTH THREE TIMES A DAY FOR 10 DAYS    MONTELUKAST (SINGULAIR) 10 MG TABLET    Take 1 tablet (10 mg total) by mouth every evening.    NEOMYCIN-POLYMYXIN-DEXAMETHASONE (MAXITROL) 3.5MG/ML-10,000 UNIT/ML-0.1 % DRPS    Place 1 drop into the right eye every 3 (three) hours.    PROGESTERONE (PROMETRIUM) 200 MG CAPSULE    TAKE 1 CAPSULE BY MOUTH EVERYDAY AT BEDTIME         Follow-up if symptoms worsen or fail to improve.

## 2019-03-20 ENCOUNTER — PATIENT MESSAGE (OUTPATIENT)
Dept: PRIMARY CARE CLINIC | Facility: CLINIC | Age: 44
End: 2019-03-20

## 2019-03-20 DIAGNOSIS — J03.90 TONSILLITIS: Primary | ICD-10-CM

## 2019-03-20 DIAGNOSIS — R05.9 COUGH IN ADULT: Primary | ICD-10-CM

## 2019-03-20 RX ORDER — BENZONATATE 100 MG/1
100 CAPSULE ORAL 3 TIMES DAILY PRN
Qty: 30 CAPSULE | Refills: 0 | Status: SHIPPED | OUTPATIENT
Start: 2019-03-20 | End: 2019-03-30

## 2019-03-20 RX ORDER — AMOXICILLIN 875 MG/1
875 TABLET, FILM COATED ORAL EVERY 12 HOURS
Qty: 14 TABLET | Refills: 0 | Status: SHIPPED | OUTPATIENT
Start: 2019-03-20 | End: 2019-03-27

## 2019-03-29 RX ORDER — ALBUTEROL SULFATE 90 UG/1
2 AEROSOL, METERED RESPIRATORY (INHALATION) EVERY 6 HOURS PRN
Qty: 18 INHALER | Refills: 3 | Status: SHIPPED | OUTPATIENT
Start: 2019-03-29

## 2019-03-29 RX ORDER — MUPIROCIN 20 MG/G
OINTMENT TOPICAL
Qty: 30 G | Refills: 1 | Status: SHIPPED | OUTPATIENT
Start: 2019-03-29 | End: 2020-10-27 | Stop reason: SDUPTHER

## 2019-06-19 ENCOUNTER — OFFICE VISIT (OUTPATIENT)
Dept: FAMILY MEDICINE | Facility: CLINIC | Age: 44
End: 2019-06-19
Payer: COMMERCIAL

## 2019-06-19 ENCOUNTER — PATIENT MESSAGE (OUTPATIENT)
Dept: FAMILY MEDICINE | Facility: CLINIC | Age: 44
End: 2019-06-19

## 2019-06-19 VITALS
HEIGHT: 63 IN | DIASTOLIC BLOOD PRESSURE: 60 MMHG | RESPIRATION RATE: 18 BRPM | WEIGHT: 192 LBS | HEART RATE: 97 BPM | SYSTOLIC BLOOD PRESSURE: 122 MMHG | BODY MASS INDEX: 34.02 KG/M2 | OXYGEN SATURATION: 97 % | TEMPERATURE: 99 F

## 2019-06-19 DIAGNOSIS — S46.812A STRAIN OF LEFT TRAPEZIUS MUSCLE, INITIAL ENCOUNTER: ICD-10-CM

## 2019-06-19 DIAGNOSIS — J02.9 PHARYNGITIS, UNSPECIFIED ETIOLOGY: ICD-10-CM

## 2019-06-19 DIAGNOSIS — Z12.31 ENCOUNTER FOR SCREENING MAMMOGRAM FOR MALIGNANT NEOPLASM OF BREAST: Primary | ICD-10-CM

## 2019-06-19 DIAGNOSIS — J02.9 SORE THROAT: Primary | ICD-10-CM

## 2019-06-19 LAB
CTP QC/QA: YES
S PYO RRNA THROAT QL PROBE: NEGATIVE

## 2019-06-19 PROCEDURE — 87880 STREP A ASSAY W/OPTIC: CPT | Mod: 59,QW,, | Performed by: NURSE PRACTITIONER

## 2019-06-19 PROCEDURE — 3008F BODY MASS INDEX DOCD: CPT | Mod: CPTII,S$GLB,, | Performed by: NURSE PRACTITIONER

## 2019-06-19 PROCEDURE — 87880 POCT RAPID STREP A: ICD-10-PCS | Mod: 59,QW,, | Performed by: NURSE PRACTITIONER

## 2019-06-19 PROCEDURE — 87070 CULTURE OTHR SPECIMN AEROBIC: CPT

## 2019-06-19 PROCEDURE — 3008F PR BODY MASS INDEX (BMI) DOCUMENTED: ICD-10-PCS | Mod: CPTII,S$GLB,, | Performed by: NURSE PRACTITIONER

## 2019-06-19 PROCEDURE — 99214 OFFICE O/P EST MOD 30 MIN: CPT | Mod: 25,S$GLB,, | Performed by: NURSE PRACTITIONER

## 2019-06-19 PROCEDURE — 96372 PR INJECTION,THERAP/PROPH/DIAG2ST, IM OR SUBCUT: ICD-10-PCS | Mod: S$GLB,,, | Performed by: NURSE PRACTITIONER

## 2019-06-19 PROCEDURE — 99214 PR OFFICE/OUTPT VISIT, EST, LEVL IV, 30-39 MIN: ICD-10-PCS | Mod: 25,S$GLB,, | Performed by: NURSE PRACTITIONER

## 2019-06-19 PROCEDURE — 96372 THER/PROPH/DIAG INJ SC/IM: CPT | Mod: S$GLB,,, | Performed by: NURSE PRACTITIONER

## 2019-06-19 PROCEDURE — 3078F DIAST BP <80 MM HG: CPT | Mod: CPTII,S$GLB,, | Performed by: NURSE PRACTITIONER

## 2019-06-19 PROCEDURE — 3078F PR MOST RECENT DIASTOLIC BLOOD PRESSURE < 80 MM HG: ICD-10-PCS | Mod: CPTII,S$GLB,, | Performed by: NURSE PRACTITIONER

## 2019-06-19 PROCEDURE — 3074F SYST BP LT 130 MM HG: CPT | Mod: CPTII,S$GLB,, | Performed by: NURSE PRACTITIONER

## 2019-06-19 PROCEDURE — 3074F PR MOST RECENT SYSTOLIC BLOOD PRESSURE < 130 MM HG: ICD-10-PCS | Mod: CPTII,S$GLB,, | Performed by: NURSE PRACTITIONER

## 2019-06-19 RX ORDER — DEXAMETHASONE SODIUM PHOSPHATE 4 MG/ML
4 INJECTION, SOLUTION INTRA-ARTICULAR; INTRALESIONAL; INTRAMUSCULAR; INTRAVENOUS; SOFT TISSUE EVERY 6 HOURS
Status: SHIPPED | OUTPATIENT
Start: 2019-06-19

## 2019-06-19 RX ADMIN — DEXAMETHASONE SODIUM PHOSPHATE 4 MG: 4 INJECTION, SOLUTION INTRA-ARTICULAR; INTRALESIONAL; INTRAMUSCULAR; INTRAVENOUS; SOFT TISSUE at 01:06

## 2019-06-19 NOTE — PROGRESS NOTES
Answers for HPI/ROS submitted by the patient on 6/18/2019   Sore throat  Chronicity: new  Onset: 1 to 4 weeks ago  Progression since onset: gradually improving  Pain worse on: right  Fever: no fever  Fever duration: less than 1 day  Pain - numeric: 2/10  abdominal pain: No  congestion: Yes  cough: Yes  diarrhea: No  drooling: No  ear discharge: Yes  ear pain: No  headaches: Yes  hoarse voice: Yes  neck pain: Yes  plugged ear sensation: Yes  shortness of breath: No  stridor: No  swollen glands: Yes  trouble swallowing: Yes  vomiting: No  strep: Yes  mono: No  Treatments tried: NSAIDs, cool liquids, gargles  Improvement on treatment: moderate  Pain severity: mild

## 2019-06-19 NOTE — PROGRESS NOTES
Subjective:       Patient ID: Noni Lazo is a 44 y.o. female.    Chief Complaint: Sore Throat (Dx with strep 10 days ago, just finished antibiotic Still having some pain)    HPI Answers for HPI/ROS submitted by the patient on 6/18/2019   Sore throat  Chronicity: new  Onset: 1 to 4 weeks ago  Progression since onset: gradually improving  Pain worse on: right  Fever: no fever  Fever duration: less than 1 day  Pain - numeric: 2/10  abdominal pain: No  congestion: Yes  cough: Yes  diarrhea: No  drooling: No  ear discharge: Yes  ear pain: No  headaches: Yes  hoarse voice: Yes  neck pain: Yes  plugged ear sensation: Yes  shortness of breath: No  stridor: No  swollen glands: Yes  trouble swallowing: Yes  vomiting: No  strep: Yes  mono: No  Treatments tried: NSAIDs, cool liquids, gargles  Improvement on treatment: moderate  Pain severity: mild    She was diagnosed and treated for strep throat about 10 days ago. States no fever but having some mild throat pain and PND. States sinus pressure headache and hoarseness. No fever. States throat feels full. States some stiffness to upper back/neck area like muscle tightness. Coughing, dry. Low grade fever. See ROS.    The following portion of the patients history was reviewed and updated as appropriate: allergies, current medications, past medical and surgical history. Past social history and problem list reviewed. Family PMH and Past social history reviewed. Tobacco, Illicit drug use reviewed.     Review of Systems   Constitutional: Positive for fatigue. Negative for fever.   HENT: Positive for congestion, postnasal drip, rhinorrhea, sore throat, trouble swallowing and voice change (hoarseness). Negative for drooling, ear discharge, ear pain, sinus pressure, sinus pain and sneezing.    Eyes: Negative for itching and visual disturbance.   Respiratory: Positive for cough and wheezing (feels like she is wheezing at times). Negative for chest tightness, shortness of breath and  "stridor.    Cardiovascular: Negative for chest pain, palpitations and leg swelling.   Gastrointestinal: Negative for abdominal pain, diarrhea, nausea and vomiting.   Genitourinary: Negative for difficulty urinating, dysuria and urgency.   Musculoskeletal: Positive for neck pain (muscle tightness). Negative for arthralgias, back pain and gait problem.   Neurological: Positive for headaches (sinus pressure type headache across the forehead). Negative for light-headedness.   Hematological: Negative for adenopathy.       Objective:     /60   Pulse 97   Temp 99.4 °F (37.4 °C) (Oral)   Resp 18   Ht 5' 3" (1.6 m)   Wt 87.1 kg (192 lb)   SpO2 97%   BMI 34.01 kg/m²      Physical Exam   Constitutional: She appears well-developed and well-nourished.   HENT:   Head: Normocephalic.   Right Ear: Tympanic membrane, external ear and ear canal normal.   Left Ear: Tympanic membrane, external ear and ear canal normal.   Nose: Mucosal edema and rhinorrhea present. Right sinus exhibits no maxillary sinus tenderness and no frontal sinus tenderness. Left sinus exhibits no maxillary sinus tenderness and no frontal sinus tenderness.   Mouth/Throat: Uvula is midline and mucous membranes are normal. Posterior oropharyngeal erythema present. No oropharyngeal exudate.   Eyes: Pupils are equal, round, and reactive to light. Conjunctivae, EOM and lids are normal.   Neck: Trachea normal. No JVD present. Spinous process tenderness and muscular tenderness (left trapezius area) present. Carotid bruit is not present. Decreased range of motion present. No thyromegaly present.   Cardiovascular: Normal rate, regular rhythm and normal heart sounds. Exam reveals no gallop.   No murmur heard.  Pulmonary/Chest: Effort normal. She has no decreased breath sounds. She has no wheezes. She has no rhonchi. She has no rales.   Abdominal: Soft. Bowel sounds are normal. There is no tenderness.   Musculoskeletal:   Gait and coordination normal. Normal " upper and lower extremity strength. Some tenderness along the left trapezius muscle with muscle spasms noted.       Assessment:       1. Sore throat    2. Pharyngitis, unspecified etiology    3. Strain of left trapezius muscle, initial encounter        Plan:         Noni was seen today for sore throat.    Diagnoses and all orders for this visit:    Sore throat: strep was negative.  -     POCT rapid strep A    Pharyngitis, unspecified etiology: will send for culture.   -     dexamethasone injection 4 mg:  Risks and benefits discussed. Potential side effects such as anxiety, palpitations and flushing discussed. May increase blood glucose levels over the next 48 hours. Potential for skin atrophy at injection site. Tolerated injection well.  -     Throat culture    Strain of left trapezius muscle, initial encounter: moist heat, stretching, massage.    Continue current medication  Take medications only as prescribed  Healthy diet, exercise  Adequate rest  Adequate hydration  Avoid allergens  Avoid excessive caffeine

## 2019-06-21 ENCOUNTER — PATIENT MESSAGE (OUTPATIENT)
Dept: FAMILY MEDICINE | Facility: CLINIC | Age: 44
End: 2019-06-21

## 2019-06-22 LAB — BACTERIA THROAT CULT: NORMAL

## 2019-06-26 RX ORDER — AMLODIPINE BESYLATE 10 MG/1
10 TABLET ORAL DAILY
Qty: 30 TABLET | Refills: 8 | Status: SHIPPED | OUTPATIENT
Start: 2019-06-26 | End: 2019-06-28 | Stop reason: SDUPTHER

## 2019-06-28 RX ORDER — AMLODIPINE BESYLATE 10 MG/1
10 TABLET ORAL DAILY
Qty: 90 TABLET | Refills: 1 | Status: SHIPPED | OUTPATIENT
Start: 2019-06-28 | End: 2020-03-20 | Stop reason: SDUPTHER

## 2019-06-28 RX ORDER — TRIAMTERENE AND HYDROCHLOROTHIAZIDE 37.5; 25 MG/1; MG/1
1 CAPSULE ORAL DAILY
Qty: 90 CAPSULE | Refills: 1 | Status: SHIPPED | OUTPATIENT
Start: 2019-06-28 | End: 2020-01-17 | Stop reason: SDUPTHER

## 2019-06-28 RX ORDER — PROPRANOLOL HYDROCHLORIDE 160 MG/1
160 CAPSULE, EXTENDED RELEASE ORAL DAILY
Qty: 90 CAPSULE | Refills: 2 | Status: CANCELLED | OUTPATIENT
Start: 2019-06-28

## 2019-06-28 RX ORDER — ATORVASTATIN CALCIUM 20 MG/1
20 TABLET, FILM COATED ORAL DAILY
Qty: 90 TABLET | Refills: 1 | Status: SHIPPED | OUTPATIENT
Start: 2019-06-28 | End: 2020-05-10 | Stop reason: SDUPTHER

## 2019-06-28 RX ORDER — PROPRANOLOL HYDROCHLORIDE 160 MG/1
160 CAPSULE, EXTENDED RELEASE ORAL DAILY
Qty: 30 CAPSULE | Refills: 8 | Status: SHIPPED | OUTPATIENT
Start: 2019-06-28 | End: 2020-03-19

## 2019-06-28 NOTE — TELEPHONE ENCOUNTER
----- Message from Juhi Hammonds sent at 6/28/2019  8:45 AM CDT -----  Contact: patient  Type:  RX Refill Request    Who Called:  patient  Refill or New Rx:  refill  RX Name and Strength:    1. propranolol (INDERAL LA) 160 mg 24 hr capsule  2. atorvastatin (LIPITOR) 20 MG tablet  3. triamterene-hydrochlorothiazide 37.5-25 mg (DYAZIDE) 37.5-25 mg per capsule  4. amLODIPine (NORVASC) 10 MG tablet  Preferred Pharmacy with phone number:    [facility]/pharmacy #[id number] - [place] - [address]  [address] [place], [place] [address].  [facility] [address]  Phone: [phone] Fax: [phone]    Local or Mail Order:  local  Ordering Provider:  Alexey Beltre Call Back Number:  [phone]  Additional Information:  Patient states that she is completely out of the medications.

## 2019-07-03 ENCOUNTER — PATIENT MESSAGE (OUTPATIENT)
Dept: FAMILY MEDICINE | Facility: CLINIC | Age: 44
End: 2019-07-03

## 2019-07-25 RX ORDER — ERGOCALCIFEROL 1.25 MG/1
CAPSULE ORAL
Qty: 12 CAPSULE | Refills: 3 | Status: SHIPPED | OUTPATIENT
Start: 2019-07-25 | End: 2020-03-17 | Stop reason: SDUPTHER

## 2019-08-20 ENCOUNTER — PATIENT MESSAGE (OUTPATIENT)
Dept: FAMILY MEDICINE | Facility: CLINIC | Age: 44
End: 2019-08-20

## 2019-08-20 RX ORDER — NAPROXEN 500 MG/1
500 TABLET ORAL 2 TIMES DAILY WITH MEALS
Qty: 40 TABLET | Refills: 1 | Status: SHIPPED | OUTPATIENT
Start: 2019-08-20 | End: 2020-10-27 | Stop reason: SDUPTHER

## 2019-09-09 NOTE — TELEPHONE ENCOUNTER
Chronic cough can be caused from undiagnosed acid reflux. I will send in nexium. Take daily. She is already on allergy medication with the flonase and singulair, continue that. I will send in tessalon perles for the cough, can have one TID.  If not improving with the nexium let me know.  Take it at bedtime by itself with water. Will take a week or so to work.    Quality 111:Pneumonia Vaccination Status For Older Adults: Pneumococcal Vaccination not Administered or Previously Received, Reason not Otherwise Specified Quality 431: Preventive Care And Screening: Unhealthy Alcohol Use - Screening: Unhealthy alcohol use screening not performed, reason not otherwise specified Detail Level: Detailed Quality 226: Preventive Care And Screening: Tobacco Use: Screening And Cessation Intervention: Patient screened for tobacco use and is an ex/non-smoker Quality 110: Preventive Care And Screening: Influenza Immunization: Influenza Immunization Administered during Influenza season

## 2019-10-03 ENCOUNTER — PATIENT MESSAGE (OUTPATIENT)
Dept: FAMILY MEDICINE | Facility: CLINIC | Age: 44
End: 2019-10-03

## 2019-10-03 DIAGNOSIS — Z00.00 LABORATORY EXAM ORDERED AS PART OF ROUTINE GENERAL MEDICAL EXAMINATION: Primary | ICD-10-CM

## 2019-10-20 ENCOUNTER — PATIENT MESSAGE (OUTPATIENT)
Dept: FAMILY MEDICINE | Facility: CLINIC | Age: 44
End: 2019-10-20

## 2019-10-22 NOTE — TELEPHONE ENCOUNTER
----- Message from Isidro Escobar sent at 10/22/2019  8:50 AM CDT -----  Contact: [facility]  [phone]  Type: Needs Medical Advice    Who Called:  [facility]  [phone]    Additional Information: Advised expecting to have complete lab orders in the system today. Please call to advise when orders are issued.

## 2019-11-05 ENCOUNTER — PATIENT MESSAGE (OUTPATIENT)
Dept: FAMILY MEDICINE | Facility: CLINIC | Age: 44
End: 2019-11-05

## 2019-11-05 LAB
25(OH)D3 SERPL-MCNC: 22 NG/ML (ref 30–100)
ALBUMIN SERPL-MCNC: 4.5 G/DL (ref 3.6–5.1)
ALBUMIN/GLOB SERPL: 1.6 (CALC) (ref 1–2.5)
ALP SERPL-CCNC: 62 U/L (ref 33–115)
ALT SERPL-CCNC: 22 U/L (ref 6–29)
AST SERPL-CCNC: 17 U/L (ref 10–30)
BASOPHILS # BLD AUTO: 52 CELLS/UL (ref 0–200)
BASOPHILS NFR BLD AUTO: 0.9 %
BILIRUB SERPL-MCNC: 0.4 MG/DL (ref 0.2–1.2)
BUN SERPL-MCNC: 11 MG/DL (ref 7–25)
BUN/CREAT SERPL: ABNORMAL (CALC) (ref 6–22)
CALCIUM SERPL-MCNC: 10.5 MG/DL (ref 8.6–10.2)
CHLORIDE SERPL-SCNC: 96 MMOL/L (ref 98–110)
CHOLEST SERPL-MCNC: 196 MG/DL
CHOLEST/HDLC SERPL: 3.4 (CALC)
CO2 SERPL-SCNC: 32 MMOL/L (ref 20–32)
CREAT SERPL-MCNC: 0.84 MG/DL (ref 0.5–1.1)
EOSINOPHIL # BLD AUTO: 197 CELLS/UL (ref 15–500)
EOSINOPHIL NFR BLD AUTO: 3.4 %
ERYTHROCYTE [DISTWIDTH] IN BLOOD BY AUTOMATED COUNT: 13.2 % (ref 11–15)
GFRSERPLBLD MDRD-ARVRAT: 85 ML/MIN/1.73M2
GLOBULIN SER CALC-MCNC: 2.8 G/DL (CALC) (ref 1.9–3.7)
GLUCOSE SERPL-MCNC: 100 MG/DL (ref 65–99)
HBA1C MFR BLD: 6.2 % OF TOTAL HGB
HCT VFR BLD AUTO: 41.8 % (ref 35–45)
HDLC SERPL-MCNC: 58 MG/DL
HGB BLD-MCNC: 13.4 G/DL (ref 11.7–15.5)
LDLC SERPL CALC-MCNC: 118 MG/DL (CALC)
LYMPHOCYTES # BLD AUTO: 1746 CELLS/UL (ref 850–3900)
LYMPHOCYTES NFR BLD AUTO: 30.1 %
MCH RBC QN AUTO: 25.9 PG (ref 27–33)
MCHC RBC AUTO-ENTMCNC: 32.1 G/DL (ref 32–36)
MCV RBC AUTO: 80.9 FL (ref 80–100)
MONOCYTES # BLD AUTO: 528 CELLS/UL (ref 200–950)
MONOCYTES NFR BLD AUTO: 9.1 %
NEUTROPHILS # BLD AUTO: 3277 CELLS/UL (ref 1500–7800)
NEUTROPHILS NFR BLD AUTO: 56.5 %
NONHDLC SERPL-MCNC: 138 MG/DL (CALC)
PLATELET # BLD AUTO: 421 THOUSAND/UL (ref 140–400)
PMV BLD REES-ECKER: 10.8 FL (ref 7.5–12.5)
POTASSIUM SERPL-SCNC: 3.5 MMOL/L (ref 3.5–5.3)
PROT SERPL-MCNC: 7.3 G/DL (ref 6.1–8.1)
RBC # BLD AUTO: 5.17 MILLION/UL (ref 3.8–5.1)
SODIUM SERPL-SCNC: 140 MMOL/L (ref 135–146)
TRIGL SERPL-MCNC: 96 MG/DL
WBC # BLD AUTO: 5.8 THOUSAND/UL (ref 3.8–10.8)

## 2019-12-03 ENCOUNTER — PATIENT MESSAGE (OUTPATIENT)
Dept: FAMILY MEDICINE | Facility: CLINIC | Age: 44
End: 2019-12-03

## 2020-01-17 RX ORDER — TRIAMTERENE AND HYDROCHLOROTHIAZIDE 37.5; 25 MG/1; MG/1
1 CAPSULE ORAL DAILY
Qty: 90 CAPSULE | Refills: 1 | Status: SHIPPED | OUTPATIENT
Start: 2020-01-17 | End: 2020-07-01

## 2020-03-13 ENCOUNTER — PATIENT MESSAGE (OUTPATIENT)
Dept: FAMILY MEDICINE | Facility: CLINIC | Age: 45
End: 2020-03-13

## 2020-03-14 ENCOUNTER — PATIENT MESSAGE (OUTPATIENT)
Dept: FAMILY MEDICINE | Facility: CLINIC | Age: 45
End: 2020-03-14

## 2020-03-14 RX ORDER — AMOXICILLIN 500 MG/1
500 CAPSULE ORAL EVERY 12 HOURS
Qty: 20 CAPSULE | Refills: 0 | Status: SHIPPED | OUTPATIENT
Start: 2020-03-14 | End: 2020-08-04 | Stop reason: ALTCHOICE

## 2020-03-17 DIAGNOSIS — J45.909 ASTHMA, UNSPECIFIED ASTHMA SEVERITY, UNSPECIFIED WHETHER COMPLICATED, UNSPECIFIED WHETHER PERSISTENT: ICD-10-CM

## 2020-03-17 RX ORDER — ERGOCALCIFEROL 1.25 MG/1
CAPSULE ORAL
Qty: 12 CAPSULE | Refills: 3 | Status: SHIPPED | OUTPATIENT
Start: 2020-03-17 | End: 2021-07-17 | Stop reason: SDUPTHER

## 2020-03-17 RX ORDER — FLUTICASONE PROPIONATE AND SALMETEROL 250; 50 UG/1; UG/1
1 POWDER RESPIRATORY (INHALATION) 2 TIMES DAILY
Qty: 60 EACH | Refills: 3 | Status: SHIPPED | OUTPATIENT
Start: 2020-03-17 | End: 2020-03-19 | Stop reason: SDUPTHER

## 2020-03-19 ENCOUNTER — PATIENT MESSAGE (OUTPATIENT)
Dept: FAMILY MEDICINE | Facility: CLINIC | Age: 45
End: 2020-03-19

## 2020-03-19 DIAGNOSIS — J45.909 ASTHMA, UNSPECIFIED ASTHMA SEVERITY, UNSPECIFIED WHETHER COMPLICATED, UNSPECIFIED WHETHER PERSISTENT: ICD-10-CM

## 2020-03-19 RX ORDER — PROPRANOLOL HYDROCHLORIDE 160 MG/1
160 CAPSULE, EXTENDED RELEASE ORAL DAILY
Qty: 30 CAPSULE | Refills: 2 | Status: SHIPPED | OUTPATIENT
Start: 2020-03-19 | End: 2020-05-11

## 2020-03-19 RX ORDER — FLUTICASONE PROPIONATE AND SALMETEROL 250; 50 UG/1; UG/1
1 POWDER RESPIRATORY (INHALATION) 2 TIMES DAILY
Qty: 60 EACH | Refills: 3 | Status: SHIPPED | OUTPATIENT
Start: 2020-03-19 | End: 2020-03-25 | Stop reason: SDUPTHER

## 2020-03-19 NOTE — TELEPHONE ENCOUNTER
When I was pending this order, a warning came up stating there is a high risk between advair and inderal.

## 2020-03-20 ENCOUNTER — PATIENT MESSAGE (OUTPATIENT)
Dept: FAMILY MEDICINE | Facility: CLINIC | Age: 45
End: 2020-03-20

## 2020-03-20 RX ORDER — AMLODIPINE BESYLATE 10 MG/1
10 TABLET ORAL DAILY
Qty: 30 TABLET | Refills: 1 | Status: SHIPPED | OUTPATIENT
Start: 2020-03-20 | End: 2020-09-02 | Stop reason: SDUPTHER

## 2020-03-24 ENCOUNTER — PATIENT MESSAGE (OUTPATIENT)
Dept: FAMILY MEDICINE | Facility: CLINIC | Age: 45
End: 2020-03-24

## 2020-03-25 ENCOUNTER — OFFICE VISIT (OUTPATIENT)
Dept: FAMILY MEDICINE | Facility: CLINIC | Age: 45
End: 2020-03-25
Payer: COMMERCIAL

## 2020-03-25 DIAGNOSIS — F41.9 ANXIETY: Primary | ICD-10-CM

## 2020-03-25 DIAGNOSIS — J45.909 ASTHMA, UNSPECIFIED ASTHMA SEVERITY, UNSPECIFIED WHETHER COMPLICATED, UNSPECIFIED WHETHER PERSISTENT: ICD-10-CM

## 2020-03-25 PROCEDURE — 99213 PR OFFICE/OUTPT VISIT, EST, LEVL III, 20-29 MIN: ICD-10-PCS | Mod: 95,,, | Performed by: NURSE PRACTITIONER

## 2020-03-25 PROCEDURE — 99213 OFFICE O/P EST LOW 20 MIN: CPT | Mod: 95,,, | Performed by: NURSE PRACTITIONER

## 2020-03-25 RX ORDER — FLUTICASONE PROPIONATE AND SALMETEROL 250; 50 UG/1; UG/1
1 POWDER RESPIRATORY (INHALATION) 2 TIMES DAILY
Qty: 60 EACH | Refills: 3 | Status: SHIPPED | OUTPATIENT
Start: 2020-03-25 | End: 2021-07-17 | Stop reason: SDUPTHER

## 2020-03-25 RX ORDER — ALPRAZOLAM 0.25 MG/1
0.25 TABLET ORAL 2 TIMES DAILY PRN
Qty: 20 TABLET | Refills: 0 | Status: SHIPPED | OUTPATIENT
Start: 2020-03-25 | End: 2020-06-30 | Stop reason: SDUPTHER

## 2020-03-25 NOTE — PROGRESS NOTES
The patient location is: [place]  The chief complaint leading to consultation is:  anxiety  Visit type: Virtual visit with synchronous audio and video  Total time spent with patient: 15 minutes  Each patient to whom he or she provides medical services by telemedicine is:  (1) informed of the relationship between the physician and patient and the respective role of any other health care provider with respect to management of the patient; and (2) notified that he or she may decline to receive medical services by telemedicine and may withdraw from such care at any time.    Notes:     Subjective:       Patient ID: Noni Lazo is a 45 y.o. female.    Chief Complaint: anxiety    HPI having a lot of anxiety. Not sleeping due to anxiety. States she has been in her house for the past 15 days. She is going outside to sit on the porch, etc. States she will get a tightness in her chest and start feeling like her heart is racing. States she will start thinking that she has a fever, etc and has the Covid-19 virus but then her temperature is normal. She has not been exposed to anyone with the virus. States she feels like this is just anxiety. States seems to be worse at night when time to sit down or go to bed. She has taken xanax in the past for anxiety when her father  and that helped. She is requesting a few to help only on PRN basis when she feels like she is having an anxiety attack.     She denies any other concerns. See ROS.    The following portion of the patients history was reviewed and updated as appropriate: allergies, current medications, past medical and surgical history. Past social history and problem list reviewed. Family PMH and Past social history reviewed. Tobacco, Illicit drug use reviewed.      Review of patient's allergies indicates:   Allergen Reactions    Biaxin [clarithromycin]     Epinephrine          Current Outpatient Medications:     acetaminophen (TYLENOL) 500 MG tablet, Take 2 tablets  by mouth Once daily as needed., Disp: , Rfl:     albuterol (VENTOLIN HFA) 90 mcg/actuation inhaler, Inhale 2 puffs into the lungs every 6 (six) hours as needed for Wheezing. RescueINHALE 2 PUFFS INTO THE LUNGS EVERY 6 (SIX) HOURS AS NEEDED FOR WHEEZING. RESCUE, Disp: 18 Inhaler, Rfl: 3    amLODIPine (NORVASC) 10 MG tablet, Take 1 tablet (10 mg total) by mouth once daily., Disp: 30 tablet, Rfl: 1    amoxicillin (AMOXIL) 500 MG capsule, Take 1 capsule (500 mg total) by mouth every 12 (twelve) hours., Disp: 20 capsule, Rfl: 0    atorvastatin (LIPITOR) 20 MG tablet, Take 1 tablet (20 mg total) by mouth once daily., Disp: 90 tablet, Rfl: 1    azelastine (ASTELIN) 137 mcg (0.1 %) nasal spray, 1 spray (137 mcg total) by Nasal route 2 (two) times daily., Disp: 30 mL, Rfl: 5    blood sugar diagnostic Strp, For daily and prn checks.  True Matrix meter., Disp: 45 strip, Rfl: 11    ergocalciferol (VITAMIN D2) 50,000 unit Cap, TAKE ONE CAPSULE BY MOUTH ONE TIME PER WEEK, Disp: 12 capsule, Rfl: 3    esomeprazole (NEXIUM) 40 MG capsule, Take 1 capsule (40 mg total) by mouth before breakfast., Disp: 90 capsule, Rfl: 2    fluticasone (FLONASE) 50 mcg/actuation nasal spray, 2 sprays (100 mcg total) by Each Nare route once daily., Disp: 16 g, Rfl: 2    fluticasone-salmeterol diskus inhaler 250-50 mcg, Inhale 1 puff into the lungs 2 (two) times daily. Controller, Disp: 60 each, Rfl: 3    ibuprofen (ADVIL,MOTRIN) 200 MG tablet, Take 200 mg by mouth every 6 (six) hours as needed.  , Disp: , Rfl:     levocetirizine (XYZAL) 5 MG tablet, Take 1 tablet (5 mg total) by mouth every evening., Disp: 30 tablet, Rfl: 5    metFORMIN (GLUCOPHAGE) 500 MG tablet, Take 500 mg by mouth once daily., Disp: , Rfl: 3    mupirocin (BACTROBAN) 2 % ointment, Apply topically as needed., Disp: 30 g, Rfl: 1    naproxen (NAPROSYN) 500 MG tablet, Take 1 tablet (500 mg total) by mouth 2 (two) times daily with meals., Disp: 40 tablet, Rfl: 1     propranoloL (INDERAL LA) 160 mg 24 hr capsule, TAKE 1 CAPSULE (160 MG TOTAL) BY MOUTH ONCE DAILY., Disp: 30 capsule, Rfl: 2    triamterene-hydrochlorothiazide 37.5-25 mg (DYAZIDE) 37.5-25 mg per capsule, Take 1 capsule by mouth once daily., Disp: 90 capsule, Rfl: 1    Current Facility-Administered Medications:     dexamethasone injection 4 mg, 4 mg, Intramuscular, Q6H, Batool Blackburn NP, 4 mg at 06/19/19 1304    Past Medical History:   Diagnosis Date    Diabetes     Heart murmur     Hypertension        Past Surgical History:   Procedure Laterality Date    WISDOM TOOTH EXTRACTION         Social History     Socioeconomic History    Marital status: Single     Spouse name: Not on file    Number of children: Not on file    Years of education: Not on file    Highest education level: Not on file   Occupational History    Not on file   Social Needs    Financial resource strain: Not very hard    Food insecurity:     Worry: Never true     Inability: Never true    Transportation needs:     Medical: No     Non-medical: No   Tobacco Use    Smoking status: Never Smoker    Smokeless tobacco: Never Used   Substance and Sexual Activity    Alcohol use: No     Frequency: Monthly or less     Drinks per session: 1 or 2     Binge frequency: Never    Drug use: Not Currently    Sexual activity: Not Currently   Lifestyle    Physical activity:     Days per week: 1 day     Minutes per session: 10 min    Stress: To some extent   Relationships    Social connections:     Talks on phone: More than three times a week     Gets together: More than three times a week     Attends Alevism service: Not on file     Active member of club or organization: Yes     Attends meetings of clubs or organizations: More than 4 times per year     Relationship status: Never    Other Topics Concern    Not on file   Social History Narrative    Not on file     Review of Systems   Constitutional: Negative for fatigue and  fever.   HENT: Negative.    Respiratory: Negative for cough, shortness of breath and wheezing.    Cardiovascular: Negative for chest pain and palpitations.   Gastrointestinal: Negative for abdominal pain, diarrhea, nausea and vomiting.   Musculoskeletal: Negative for arthralgias, back pain and gait problem.   Neurological: Negative for headaches.   Psychiatric/Behavioral: Positive for sleep disturbance. Negative for dysphoric mood, self-injury and suicidal ideas. The patient is nervous/anxious.        Objective:      Physical Exam   Constitutional: She is oriented to person, place, and time. She appears well-developed and well-nourished. No distress.   Pulmonary/Chest:   No audible wheezing. Does not appear to have any respiratory distress   Musculoskeletal:   Normal coordination noted.   Neurological: She is alert and oriented to person, place, and time.   Psychiatric: She has a normal mood and affect. Her behavior is normal. Judgment and thought content normal.   She does not present as a threat to herself or others.       Assessment:       1. Anxiety    2. Asthma, unspecified asthma severity, unspecified whether complicated, unspecified whether persistent        Plan:       Anxiety: will give a few xanax to take only on PRN basis for feelings of anxiety.  She understands Xanax is a benzodiazepine with the potential for addiction and tolerance.  The patient also understands that all benzos impair reflexes and cognition and so the patient should not drive, operate heavy machinery or make significant decisions while taking the benzodiazepine.  The patient should also not take the benzodiazepines with alcohol.    Asthma, unspecified asthma severity, unspecified whether complicated, unspecified whether persistent: good control but would like refill of her Advair.   -     fluticasone-salmeterol diskus inhaler 250-50 mcg; Inhale 1 puff into the lungs 2 (two) times daily. Controller  Dispense: 60 each; Refill: 3    Other  orders  -     ALPRAZolam (XANAX) 0.25 MG tablet; Take 1 tablet (0.25 mg total) by mouth 2 (two) times daily as needed for Anxiety.  Dispense: 20 tablet; Refill: 0       Continue current medication  Take medications only as prescribed  Healthy diet, exercise  Adequate rest  Adequate hydration  Avoid allergens  Avoid excessive caffeine     follow up as needed.

## 2020-04-20 ENCOUNTER — PATIENT MESSAGE (OUTPATIENT)
Dept: FAMILY MEDICINE | Facility: CLINIC | Age: 45
End: 2020-04-20

## 2020-04-21 ENCOUNTER — PATIENT MESSAGE (OUTPATIENT)
Dept: FAMILY MEDICINE | Facility: CLINIC | Age: 45
End: 2020-04-21

## 2020-04-21 ENCOUNTER — OFFICE VISIT (OUTPATIENT)
Dept: FAMILY MEDICINE | Facility: CLINIC | Age: 45
End: 2020-04-21
Payer: COMMERCIAL

## 2020-04-21 DIAGNOSIS — H00.015 HORDEOLUM EXTERNUM OF LEFT LOWER EYELID: Primary | ICD-10-CM

## 2020-04-21 DIAGNOSIS — H10.13 ALLERGIC CONJUNCTIVITIS OF BOTH EYES: ICD-10-CM

## 2020-04-21 PROCEDURE — 99212 OFFICE O/P EST SF 10 MIN: CPT | Mod: 95,,, | Performed by: NURSE PRACTITIONER

## 2020-04-21 PROCEDURE — 99212 PR OFFICE/OUTPT VISIT, EST, LEVL II, 10-19 MIN: ICD-10-PCS | Mod: 95,,, | Performed by: NURSE PRACTITIONER

## 2020-04-21 RX ORDER — NEOMYCIN/POLYMYXIN B/HYDROCORT 3.5-10K-1
1 SUSPENSION, DROPS(FINAL DOSAGE FORM)(ML) OPHTHALMIC (EYE) 3 TIMES DAILY
Qty: 7.5 ML | Refills: 0 | Status: SHIPPED | OUTPATIENT
Start: 2020-04-21 | End: 2020-04-23 | Stop reason: RX

## 2020-04-21 NOTE — PROGRESS NOTES
The patient location is: [place]  The chief complaint leading to consultation is:  stye  Visit type: audiovisual  Total time spent with patient: 6 minutes  Each patient to whom he or she provides medical services by telemedicine is:  (1) informed of the relationship between the physician and patient and the respective role of any other health care provider with respect to management of the patient; and (2) notified that he or she may decline to receive medical services by telemedicine and may withdraw from such care at any time.      Subjective:       Patient ID: Noni Lazo is a 45 y.o. female.    Chief Complaint: stye    HPI redness, stye to left lower eye lid. Eyes have been itching a lot lately.  Increased allergies. No other symptoms. See ROS.    The following portion of the patients history was reviewed and updated as appropriate: allergies, current medications, past medical and surgical history. Past social history and problem list reviewed. Family PMH and Past social history reviewed. Tobacco, Illicit drug use reviewed.      Review of patient's allergies indicates:   Allergen Reactions    Biaxin [clarithromycin]     Epinephrine          Current Outpatient Medications:     acetaminophen (TYLENOL) 500 MG tablet, Take 2 tablets by mouth Once daily as needed., Disp: , Rfl:     albuterol (VENTOLIN HFA) 90 mcg/actuation inhaler, Inhale 2 puffs into the lungs every 6 (six) hours as needed for Wheezing. RescueINHALE 2 PUFFS INTO THE LUNGS EVERY 6 (SIX) HOURS AS NEEDED FOR WHEEZING. RESCUE, Disp: 18 Inhaler, Rfl: 3    ALPRAZolam (XANAX) 0.25 MG tablet, Take 1 tablet (0.25 mg total) by mouth 2 (two) times daily as needed for Anxiety., Disp: 20 tablet, Rfl: 0    amLODIPine (NORVASC) 10 MG tablet, Take 1 tablet (10 mg total) by mouth once daily., Disp: 30 tablet, Rfl: 1    amoxicillin (AMOXIL) 500 MG capsule, Take 1 capsule (500 mg total) by mouth every 12 (twelve) hours., Disp: 20 capsule, Rfl: 0     atorvastatin (LIPITOR) 20 MG tablet, Take 1 tablet (20 mg total) by mouth once daily., Disp: 90 tablet, Rfl: 1    azelastine (ASTELIN) 137 mcg (0.1 %) nasal spray, 1 spray (137 mcg total) by Nasal route 2 (two) times daily., Disp: 30 mL, Rfl: 5    blood sugar diagnostic Strp, For daily and prn checks.  True Matrix meter., Disp: 45 strip, Rfl: 11    ergocalciferol (VITAMIN D2) 50,000 unit Cap, TAKE ONE CAPSULE BY MOUTH ONE TIME PER WEEK, Disp: 12 capsule, Rfl: 3    esomeprazole (NEXIUM) 40 MG capsule, Take 1 capsule (40 mg total) by mouth before breakfast., Disp: 90 capsule, Rfl: 2    fluticasone (FLONASE) 50 mcg/actuation nasal spray, 2 sprays (100 mcg total) by Each Nare route once daily., Disp: 16 g, Rfl: 2    fluticasone-salmeterol diskus inhaler 250-50 mcg, Inhale 1 puff into the lungs 2 (two) times daily. Controller, Disp: 60 each, Rfl: 3    ibuprofen (ADVIL,MOTRIN) 200 MG tablet, Take 200 mg by mouth every 6 (six) hours as needed.  , Disp: , Rfl:     levocetirizine (XYZAL) 5 MG tablet, Take 1 tablet (5 mg total) by mouth every evening., Disp: 30 tablet, Rfl: 5    metFORMIN (GLUCOPHAGE) 500 MG tablet, Take 500 mg by mouth once daily., Disp: , Rfl: 3    mupirocin (BACTROBAN) 2 % ointment, Apply topically as needed., Disp: 30 g, Rfl: 1    naproxen (NAPROSYN) 500 MG tablet, Take 1 tablet (500 mg total) by mouth 2 (two) times daily with meals., Disp: 40 tablet, Rfl: 1    propranoloL (INDERAL LA) 160 mg 24 hr capsule, TAKE 1 CAPSULE (160 MG TOTAL) BY MOUTH ONCE DAILY., Disp: 30 capsule, Rfl: 2    triamterene-hydrochlorothiazide 37.5-25 mg (DYAZIDE) 37.5-25 mg per capsule, Take 1 capsule by mouth once daily., Disp: 90 capsule, Rfl: 1    Current Facility-Administered Medications:     dexamethasone injection 4 mg, 4 mg, Intramuscular, Q6H, Batool Blackburn NP, 4 mg at 06/19/19 3490    Past Medical History:   Diagnosis Date    Anxiety     Diabetes     Heart murmur     Hypertension        Past  Surgical History:   Procedure Laterality Date    WISDOM TOOTH EXTRACTION         Social History     Socioeconomic History    Marital status: Single     Spouse name: Not on file    Number of children: Not on file    Years of education: Not on file    Highest education level: Not on file   Occupational History    Not on file   Social Needs    Financial resource strain: Not very hard    Food insecurity:     Worry: Never true     Inability: Never true    Transportation needs:     Medical: No     Non-medical: No   Tobacco Use    Smoking status: Never Smoker    Smokeless tobacco: Never Used   Substance and Sexual Activity    Alcohol use: No     Frequency: Monthly or less     Drinks per session: 1 or 2     Binge frequency: Never    Drug use: Not Currently    Sexual activity: Not Currently   Lifestyle    Physical activity:     Days per week: 1 day     Minutes per session: 10 min    Stress: To some extent   Relationships    Social connections:     Talks on phone: More than three times a week     Gets together: More than three times a week     Attends Worship service: Not on file     Active member of club or organization: Yes     Attends meetings of clubs or organizations: More than 4 times per year     Relationship status: Never    Other Topics Concern    Not on file   Social History Narrative    Not on file     Review of Systems   Constitutional: Negative for fatigue and fever.   HENT: Positive for postnasal drip and rhinorrhea. Negative for sinus pressure, sinus pain and sore throat.    Eyes: Positive for itching. Negative for visual disturbance.        Stye lower left lid   Respiratory: Negative for cough, shortness of breath and wheezing.    Cardiovascular: Negative for chest pain and palpitations.   Gastrointestinal: Negative for abdominal pain, diarrhea, nausea and vomiting.   Musculoskeletal: Negative for arthralgias, back pain and gait problem.   Neurological: Negative for headaches.        Objective:       Physical Exam   Constitutional: She is oriented to person, place, and time. She appears well-developed and well-nourished. No distress.   HENT:   Head: Normocephalic and atraumatic.   Nose: Rhinorrhea present. Right sinus exhibits no maxillary sinus tenderness. Left sinus exhibits no maxillary sinus tenderness.   Eyes: Conjunctivae are normal.       Pulmonary/Chest: No accessory muscle usage. No respiratory distress.   Musculoskeletal:   Coordination appears normal   Neurological: She is alert and oriented to person, place, and time.   Skin: Skin is dry.   Psychiatric: She has a normal mood and affect. Her speech is normal and behavior is normal.       Assessment:       1. Hordeolum externum of left lower eyelid    2. Allergic conjunctivitis of both eyes        Plan:       Hordeolum externum of left lower eyelid: warm compresses, wash lashes with Navi's no tears shampoo. Do not rupture pustule.     Allergic conjunctivitis of both eyes: claritin daily.     Other orders  -      neomycin-polymyxin-hydrocortisone (CORTISPORIN) 3.5-10,000-10 mg-unit-mg/mL ophthalmic suspension; Place 1 drop into the left eye 3 (three) times daily.  Dispense: 7.5 mL; Refill: 0       Continue current medication  Take medications only as prescribed  Healthy diet, exercise  Adequate rest  Adequate hydration  Avoid allergens  Avoid excessive caffeine     follow up if not improving

## 2020-04-23 ENCOUNTER — TELEPHONE (OUTPATIENT)
Dept: FAMILY MEDICINE | Facility: CLINIC | Age: 45
End: 2020-04-23

## 2020-04-23 RX ORDER — TOBRAMYCIN 3 MG/ML
1 SOLUTION/ DROPS OPHTHALMIC EVERY 4 HOURS
Qty: 5 ML | Refills: 0 | Status: SHIPPED | OUTPATIENT
Start: 2020-04-23 | End: 2022-06-06

## 2020-04-23 NOTE — TELEPHONE ENCOUNTER
----- Message from Allison Bailon sent at 4/23/2020 11:57 AM CDT -----  Contact: Noni pt  Type: Needs Medical Advice  Who Called:  Noni  Best Call Back Number: [phone]  Additional Information: Pls call pt regarding her  neomycin-polymyxin-hydrocortisone (CORTISPORIN) 3.5-10,000-10 mg-unit-mg/mL ophthalmic suspension , the pharm is out and has been out. No [facility] has it. She is requesting something else to be prescribed.

## 2020-04-23 NOTE — TELEPHONE ENCOUNTER
----- Message from Mariaa Osorio sent at 4/23/2020 12:14 PM CDT -----  Contact: [facility],   Pharmacy need new rx for different kind of eye drops, pharmacy can not get the eye drops in you sent rx for please send to [facility]    Case number [id number]  [facility]/pharmacy #[id number] - [place] - [address]  [address]  [place] [address]  Phone: [phone] Fax: [phone]

## 2020-05-11 RX ORDER — ATORVASTATIN CALCIUM 20 MG/1
TABLET, FILM COATED ORAL
Qty: 90 TABLET | Refills: 1 | Status: SHIPPED | OUTPATIENT
Start: 2020-05-11 | End: 2020-11-04

## 2020-05-11 RX ORDER — PROPRANOLOL HYDROCHLORIDE 160 MG/1
160 CAPSULE, EXTENDED RELEASE ORAL DAILY
Qty: 30 CAPSULE | Refills: 4 | Status: SHIPPED | OUTPATIENT
Start: 2020-05-11 | End: 2020-08-04

## 2020-05-11 RX ORDER — ATORVASTATIN CALCIUM 20 MG/1
20 TABLET, FILM COATED ORAL DAILY
Qty: 90 TABLET | Refills: 1 | Status: SHIPPED | OUTPATIENT
Start: 2020-05-11 | End: 2020-05-11 | Stop reason: SDUPTHER

## 2020-06-30 ENCOUNTER — PATIENT MESSAGE (OUTPATIENT)
Dept: FAMILY MEDICINE | Facility: CLINIC | Age: 45
End: 2020-06-30

## 2020-06-30 RX ORDER — ALPRAZOLAM 0.25 MG/1
0.25 TABLET ORAL 2 TIMES DAILY PRN
Qty: 20 TABLET | Refills: 0 | Status: CANCELLED | OUTPATIENT
Start: 2020-06-30 | End: 2020-07-30

## 2020-06-30 RX ORDER — ALPRAZOLAM 0.25 MG/1
0.25 TABLET ORAL 2 TIMES DAILY PRN
Qty: 20 TABLET | Refills: 0 | Status: SHIPPED | OUTPATIENT
Start: 2020-06-30 | End: 2021-01-07 | Stop reason: SDUPTHER

## 2020-07-14 ENCOUNTER — PATIENT MESSAGE (OUTPATIENT)
Dept: FAMILY MEDICINE | Facility: CLINIC | Age: 45
End: 2020-07-14

## 2020-07-14 DIAGNOSIS — E11.8 TYPE 2 DIABETES MELLITUS WITH COMPLICATION, WITHOUT LONG-TERM CURRENT USE OF INSULIN: Primary | ICD-10-CM

## 2020-07-14 DIAGNOSIS — Z11.4 SCREENING FOR HIV WITHOUT PRESENCE OF RISK FACTORS: ICD-10-CM

## 2020-07-15 RX ORDER — INSULIN PUMP SYRINGE, 3 ML
EACH MISCELLANEOUS
Qty: 1 EACH | Refills: 0 | Status: SHIPPED | OUTPATIENT
Start: 2020-07-15 | End: 2022-04-11 | Stop reason: SDUPTHER

## 2020-07-15 NOTE — TELEPHONE ENCOUNTER
She needs to go have labs done.  Not done since September. I put in orders, please ask her to get these done ASAP for me.  I will send in new glucometer and testing supplies.

## 2020-07-23 ENCOUNTER — PATIENT MESSAGE (OUTPATIENT)
Dept: FAMILY MEDICINE | Facility: CLINIC | Age: 45
End: 2020-07-23

## 2020-07-29 ENCOUNTER — TELEPHONE (OUTPATIENT)
Dept: FAMILY MEDICINE | Facility: CLINIC | Age: 45
End: 2020-07-29

## 2020-08-04 ENCOUNTER — TELEPHONE (OUTPATIENT)
Dept: FAMILY MEDICINE | Facility: CLINIC | Age: 45
End: 2020-08-04

## 2020-08-04 ENCOUNTER — OFFICE VISIT (OUTPATIENT)
Dept: FAMILY MEDICINE | Facility: CLINIC | Age: 45
End: 2020-08-04
Payer: COMMERCIAL

## 2020-08-04 DIAGNOSIS — F32.A DEPRESSION, UNSPECIFIED DEPRESSION TYPE: ICD-10-CM

## 2020-08-04 DIAGNOSIS — F41.9 ANXIETY: Primary | ICD-10-CM

## 2020-08-04 DIAGNOSIS — F41.0 PANIC DISORDER: ICD-10-CM

## 2020-08-04 DIAGNOSIS — J30.9 CHRONIC ALLERGIC RHINITIS: ICD-10-CM

## 2020-08-04 PROCEDURE — 99213 PR OFFICE/OUTPT VISIT, EST, LEVL III, 20-29 MIN: ICD-10-PCS | Mod: 95,,, | Performed by: NURSE PRACTITIONER

## 2020-08-04 PROCEDURE — 99213 OFFICE O/P EST LOW 20 MIN: CPT | Mod: 95,,, | Performed by: NURSE PRACTITIONER

## 2020-08-04 RX ORDER — FLUOXETINE 10 MG/1
10 CAPSULE ORAL DAILY
Qty: 30 CAPSULE | Refills: 2 | Status: SHIPPED | OUTPATIENT
Start: 2020-08-04 | End: 2021-05-31

## 2020-08-04 NOTE — TELEPHONE ENCOUNTER
----- Message from Batool Blackburn NP sent at 8/4/2020  3:31 PM CDT -----  I need to do virtual follow up with her in 2-3 weeks please

## 2020-08-04 NOTE — PROGRESS NOTES
Answers for HPI/ROS submitted by the patient on 8/4/2020   activity change: No  unexpected weight change: No  neck pain: No  hearing loss: No  rhinorrhea: Yes  trouble swallowing: No  eye discharge: Yes  visual disturbance: No  chest tightness: No  wheezing: Yes  chest pain: No  palpitations: No  blood in stool: No  constipation: No  vomiting: No  diarrhea: No  polydipsia: No  polyuria: No  difficulty urinating: No  hematuria: No  menstrual problem: No  dysuria: No  joint swelling: No  arthralgias: No  headaches: No  weakness: No  confusion: No  dysphoric mood: Yes    The patient location is: [place]  The chief complaint leading to consultation is: anxiety    Visit type: audiovisual    Face to Face time with patient: 10  18  minutes of total time spent on the encounter, which includes face to face time and non-face to face time preparing to see the patient (eg, review of tests), Obtaining and/or reviewing separately obtained history, Documenting clinical information in the electronic or other health record, Independently interpreting results (not separately reported) and communicating results to the patient/family/caregiver, or Care coordination (not separately reported).     Each patient to whom he or she provides medical services by telemedicine is:  (1) informed of the relationship between the physician and patient and the respective role of any other health care provider with respect to management of the patient; and (2) notified that he or she may decline to receive medical services by telemedicine and may withdraw from such care at any time.    Subjective:       Patient ID: Noni RUBI Young is a 45 y.o. female.    Chief Complaint: having increased anxiety. Feels panic if she has to go out into public.    HPI increased anxiety: taking the xanax as needed. Feeling more depressed. Crying more. States she cannot go out into public without feeling panicked. 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  "She is afraid she is going to get Covid. She states there is just too much "unknown" about all this virus stuff. She feels that she needs something to help with her anxiety and depression. She has a special needs child who she worries about as well. We discussed options and have decided on prozac. She states her allergies have been acting up more lately.  States she has not been using her allergy medication on a daily basis. She is advised to do this. She has chronic year round allergic rhinitis. She will restart her allergy medications. She has no other concerns. See ROS.    The following portion of the patients history was reviewed and updated as appropriate: allergies, current medications, past medical and surgical history. Past social history and problem list reviewed. Family PMH and Past social history reviewed. Tobacco, Illicit drug use reviewed.      Review of patient's allergies indicates:   Allergen Reactions    Biaxin [clarithromycin]     Epinephrine          Current Outpatient Medications:     acetaminophen (TYLENOL) 500 MG tablet, Take 2 tablets by mouth Once daily as needed., Disp: , Rfl:     albuterol (VENTOLIN HFA) 90 mcg/actuation inhaler, Inhale 2 puffs into the lungs every 6 (six) hours as needed for Wheezing. RescueINHALE 2 PUFFS INTO THE LUNGS EVERY 6 (SIX) HOURS AS NEEDED FOR WHEEZING. RESCUE, Disp: 18 Inhaler, Rfl: 3    ALPRAZolam (XANAX) 0.25 MG tablet, Take 1 tablet (0.25 mg total) by mouth 2 (two) times daily as needed for Anxiety., Disp: 20 tablet, Rfl: 0    amLODIPine (NORVASC) 10 MG tablet, Take 1 tablet (10 mg total) by mouth once daily., Disp: 30 tablet, Rfl: 1    amoxicillin (AMOXIL) 500 MG capsule, Take 1 capsule (500 mg total) by mouth every 12 (twelve) hours., Disp: 20 capsule, Rfl: 0    atorvastatin (LIPITOR) 20 MG tablet, TAKE 1 TABLET BY MOUTH EVERY DAY, Disp: 90 tablet, Rfl: 1    azelastine (ASTELIN) 137 mcg (0.1 %) nasal spray, 1 spray (137 mcg total) by Nasal route 2 " (two) times daily., Disp: 30 mL, Rfl: 5    blood sugar diagnostic Strp, For daily and prn checks.  True Matrix meter., Disp: 45 strip, Rfl: 11    blood-glucose meter (BLOOD GLUCOSE MONITORING) kit, Use as instructed, Disp: 1 each, Rfl: 0    ergocalciferol (VITAMIN D2) 50,000 unit Cap, TAKE ONE CAPSULE BY MOUTH ONE TIME PER WEEK, Disp: 12 capsule, Rfl: 3    esomeprazole (NEXIUM) 40 MG capsule, Take 1 capsule (40 mg total) by mouth before breakfast., Disp: 90 capsule, Rfl: 2    fluticasone (FLONASE) 50 mcg/actuation nasal spray, 2 sprays (100 mcg total) by Each Nare route once daily., Disp: 16 g, Rfl: 2    fluticasone-salmeterol diskus inhaler 250-50 mcg, Inhale 1 puff into the lungs 2 (two) times daily. Controller, Disp: 60 each, Rfl: 3    ibuprofen (ADVIL,MOTRIN) 200 MG tablet, Take 200 mg by mouth every 6 (six) hours as needed.  , Disp: , Rfl:     levocetirizine (XYZAL) 5 MG tablet, Take 1 tablet (5 mg total) by mouth every evening., Disp: 30 tablet, Rfl: 5    metFORMIN (GLUCOPHAGE) 500 MG tablet, Take 500 mg by mouth once daily., Disp: , Rfl: 3    mupirocin (BACTROBAN) 2 % ointment, Apply topically as needed., Disp: 30 g, Rfl: 1    naproxen (NAPROSYN) 500 MG tablet, Take 1 tablet (500 mg total) by mouth 2 (two) times daily with meals., Disp: 40 tablet, Rfl: 1    propranoloL (INDERAL LA) 160 mg 24 hr capsule, TAKE 1 CAPSULE (160 MG TOTAL) BY MOUTH ONCE DAILY., Disp: 30 capsule, Rfl: 4    tobramycin sulfate 0.3% (TOBREX) 0.3 % ophthalmic solution, Place 1 drop into the left eye every 4 (four) hours., Disp: 5 mL, Rfl: 0    triamterene-hydrochlorothiazide 37.5-25 mg (DYAZIDE) 37.5-25 mg per capsule, TAKE 1 CAPSULE BY MOUTH EVERY DAY, Disp: 90 capsule, Rfl: 1    Current Facility-Administered Medications:     dexamethasone injection 4 mg, 4 mg, Intramuscular, Q6H, Batool Blackburn NP, 4 mg at 06/19/19 1304    Past Medical History:   Diagnosis Date    Anxiety     Diabetes     Heart murmur      Hypertension        Past Surgical History:   Procedure Laterality Date    WISDOM TOOTH EXTRACTION         Social History     Socioeconomic History    Marital status: Single     Spouse name: Not on file    Number of children: Not on file    Years of education: Not on file    Highest education level: Not on file   Occupational History    Not on file   Social Needs    Financial resource strain: Not very hard    Food insecurity     Worry: Never true     Inability: Never true    Transportation needs     Medical: No     Non-medical: No   Tobacco Use    Smoking status: Never Smoker    Smokeless tobacco: Never Used   Substance and Sexual Activity    Alcohol use: No     Frequency: Monthly or less     Drinks per session: 1 or 2     Binge frequency: Never    Drug use: Not Currently    Sexual activity: Not Currently   Lifestyle    Physical activity     Days per week: 1 day     Minutes per session: 10 min    Stress: To some extent   Relationships    Social connections     Talks on phone: More than three times a week     Gets together: More than three times a week     Attends Sabianism service: Not on file     Active member of club or organization: Yes     Attends meetings of clubs or organizations: More than 4 times per year     Relationship status: Never    Other Topics Concern    Not on file   Social History Narrative    Not on file     Review of Systems   Constitutional: Negative for activity change and unexpected weight change.   HENT: Positive for rhinorrhea. Negative for hearing loss and trouble swallowing.    Eyes: Positive for discharge. Negative for visual disturbance.   Respiratory: Positive for wheezing. Negative for chest tightness.    Cardiovascular: Negative for chest pain and palpitations.   Gastrointestinal: Negative for blood in stool, constipation, diarrhea and vomiting.   Endocrine: Negative for polydipsia and polyuria.   Genitourinary: Negative for difficulty urinating, dysuria,  hematuria and menstrual problem.   Musculoskeletal: Negative for arthralgias, joint swelling and neck pain.   Neurological: Negative for weakness and headaches.   Psychiatric/Behavioral: Positive for dysphoric mood. Negative for confusion.       Objective:       Physical Exam  Constitutional:       General: She is not in acute distress.     Appearance: Normal appearance. She is well-developed.   HENT:      Head: Normocephalic and atraumatic.   Eyes:      Conjunctiva/sclera: Conjunctivae normal.   Neck:      Musculoskeletal: Normal range of motion. No edema.      Thyroid: No thyromegaly.      Trachea: Trachea normal. No tracheal tenderness or tracheal deviation.   Pulmonary:      Effort: Pulmonary effort is normal. No accessory muscle usage.      Breath sounds: No wheezing (no audible wheezing).   Abdominal:      Palpations: There is no splenomegaly.   Musculoskeletal:      Comments: Coordination appears normal per video   Skin:     General: Skin is dry.      Findings: No rash.      Comments: No bruising or rash noted. Patient is not pale   Neurological:      Mental Status: She is alert and oriented to person, place, and time.   Psychiatric:         Mood and Affect: Mood normal.         Speech: Speech normal.         Behavior: Behavior normal.         Thought Content: Thought content normal.         Assessment:       1. Anxiety    2. Panic disorder    3. Chronic allergic rhinitis    4. Depression, unspecified depression type        Plan:       Anxiety: continue xanax only on PRN basis.    Panic disorder    Chronic allergic rhinitis: get back on daily allergy medication.    Depression, unspecified depression type: will start on prozac 10mg daily.     Other orders  -     FLUoxetine 10 MG capsule; Take 1 capsule (10 mg total) by mouth once daily.  Dispense: 30 capsule; Refill: 2       Continue current medication  Take medications only as prescribed  Healthy diet, exercise  Adequate rest  Adequate hydration  Avoid  allergens  Avoid excessive caffeine     follow up 3 weeks.

## 2020-08-10 ENCOUNTER — OFFICE VISIT (OUTPATIENT)
Dept: PRIMARY CARE CLINIC | Facility: CLINIC | Age: 45
End: 2020-08-10
Payer: COMMERCIAL

## 2020-08-10 VITALS
DIASTOLIC BLOOD PRESSURE: 82 MMHG | WEIGHT: 175.69 LBS | SYSTOLIC BLOOD PRESSURE: 110 MMHG | OXYGEN SATURATION: 98 % | BODY MASS INDEX: 31.13 KG/M2 | HEART RATE: 95 BPM | HEIGHT: 63 IN | TEMPERATURE: 99 F

## 2020-08-10 DIAGNOSIS — F41.9 ANXIETY: ICD-10-CM

## 2020-08-10 DIAGNOSIS — K21.9 GASTROESOPHAGEAL REFLUX DISEASE WITHOUT ESOPHAGITIS: ICD-10-CM

## 2020-08-10 DIAGNOSIS — R10.11 RUQ DISCOMFORT: Primary | ICD-10-CM

## 2020-08-10 DIAGNOSIS — E11.65 TYPE 2 DIABETES MELLITUS WITH HYPERGLYCEMIA, WITHOUT LONG-TERM CURRENT USE OF INSULIN: ICD-10-CM

## 2020-08-10 DIAGNOSIS — K59.09 OTHER CONSTIPATION: ICD-10-CM

## 2020-08-10 PROCEDURE — 3079F PR MOST RECENT DIASTOLIC BLOOD PRESSURE 80-89 MM HG: ICD-10-PCS | Mod: CPTII,S$GLB,, | Performed by: NURSE PRACTITIONER

## 2020-08-10 PROCEDURE — 3044F HG A1C LEVEL LT 7.0%: CPT | Mod: CPTII,S$GLB,, | Performed by: NURSE PRACTITIONER

## 2020-08-10 PROCEDURE — 99214 OFFICE O/P EST MOD 30 MIN: CPT | Mod: S$GLB,,, | Performed by: NURSE PRACTITIONER

## 2020-08-10 PROCEDURE — 3074F SYST BP LT 130 MM HG: CPT | Mod: CPTII,S$GLB,, | Performed by: NURSE PRACTITIONER

## 2020-08-10 PROCEDURE — 3008F PR BODY MASS INDEX (BMI) DOCUMENTED: ICD-10-PCS | Mod: CPTII,S$GLB,, | Performed by: NURSE PRACTITIONER

## 2020-08-10 PROCEDURE — 3044F PR MOST RECENT HEMOGLOBIN A1C LEVEL <7.0%: ICD-10-PCS | Mod: CPTII,S$GLB,, | Performed by: NURSE PRACTITIONER

## 2020-08-10 PROCEDURE — 99214 PR OFFICE/OUTPT VISIT, EST, LEVL IV, 30-39 MIN: ICD-10-PCS | Mod: S$GLB,,, | Performed by: NURSE PRACTITIONER

## 2020-08-10 PROCEDURE — 3074F PR MOST RECENT SYSTOLIC BLOOD PRESSURE < 130 MM HG: ICD-10-PCS | Mod: CPTII,S$GLB,, | Performed by: NURSE PRACTITIONER

## 2020-08-10 PROCEDURE — 3079F DIAST BP 80-89 MM HG: CPT | Mod: CPTII,S$GLB,, | Performed by: NURSE PRACTITIONER

## 2020-08-10 PROCEDURE — 3008F BODY MASS INDEX DOCD: CPT | Mod: CPTII,S$GLB,, | Performed by: NURSE PRACTITIONER

## 2020-08-10 RX ORDER — FAMOTIDINE 40 MG/1
40 TABLET, FILM COATED ORAL DAILY
Qty: 30 TABLET | Refills: 5 | Status: SHIPPED | OUTPATIENT
Start: 2020-08-10 | End: 2022-06-06

## 2020-08-10 NOTE — PROGRESS NOTES
Subjective:       Patient ID: Noni RUBI Young is a 45 y.o. female.    Chief Complaint: GI Problem (wants to discuss stomach issues excessive gas and nasuea)  Patient was last seen by me on 03/18/2019.  Last seen by PCP MARIZOL Yost NP on 08/04/2020.  HPI   States ate italian with seafood about one week ago and started having discomfort to her left upper quad since. Whatever I eat it makes my stomach feel bad. Taking MOM with some minimal relief.  She has constipation off an on.   Vitals:    08/10/20 1034   BP: 110/82   Pulse: 95   Temp: 98.5 °F (36.9 °C)     BP Readings from Last 3 Encounters:   08/10/20 110/82   06/19/19 122/60   03/18/19 100/64     Review of Systems   Constitutional: Negative for fever.   Gastrointestinal: Positive for abdominal pain and constipation. Negative for diarrhea, nausea and vomiting.        RUQ discomfort   Genitourinary: Negative for dysuria, frequency and hematuria.   Musculoskeletal: Negative for arthralgias and myalgias.   Neurological: Negative for headaches.       Lab Results   Component Value Date    LABA1C 6.7 06/19/2018    HGBA1C 6.2 (H) 11/04/2019     She states had been eating at home exclusively and about two weeks ago started back eating out and then started with these symptoms.  Objective:      Physical Exam  Vitals signs and nursing note reviewed.   Constitutional:       General: She is awake.      Appearance: Normal appearance. She is well-developed and overweight.   HENT:      Head: Normocephalic and atraumatic.      Right Ear: Hearing, tympanic membrane, ear canal and external ear normal.      Left Ear: Hearing, tympanic membrane, ear canal and external ear normal.   Eyes:      General: Lids are normal.         Right eye: No foreign body or discharge.         Left eye: No foreign body or discharge.   Neck:      Musculoskeletal: Full passive range of motion without pain, normal range of motion and neck supple.   Cardiovascular:      Rate and Rhythm: Normal rate and  regular rhythm.   Pulmonary:      Effort: Pulmonary effort is normal.      Breath sounds: Normal breath sounds and air entry. No decreased breath sounds.   Abdominal:      General: Abdomen is protuberant. Bowel sounds are normal.      Palpations: Abdomen is soft.          Comments: She has some RUQ discomfort but no discomfort with assessment   Lymphadenopathy:      Head:      Right side of head: No submental, submandibular, tonsillar, preauricular, posterior auricular or occipital adenopathy.      Left side of head: No submental, submandibular, tonsillar, preauricular, posterior auricular or occipital adenopathy.      Cervical: No cervical adenopathy.      Right cervical: No superficial, deep or posterior cervical adenopathy.     Left cervical: No superficial, deep or posterior cervical adenopathy.   Skin:     General: Skin is warm and dry.      Findings: No rash.   Neurological:      Mental Status: She is alert and oriented to person, place, and time.   Psychiatric:         Attention and Perception: Attention and perception normal.         Mood and Affect: Mood and affect normal.         Speech: Speech normal.         Behavior: Behavior normal. Behavior is cooperative.         Thought Content: Thought content normal.         Cognition and Memory: Cognition and memory normal.         Judgment: Judgment normal.         Assessment & Plan:       RUQ discomfort  -     X-Ray Abdomen Flat And Erect; Future; Expected date: 08/10/2020  -     famotidine (PEPCID) 40 MG tablet; Take 1 tablet (40 mg total) by mouth once daily.  Dispense: 30 tablet; Refill: 5    Other constipation  -     X-Ray Abdomen Flat And Erect; Future; Expected date: 08/10/2020    Gastroesophageal reflux disease without esophagitis  -     X-Ray Abdomen Flat And Erect; Future; Expected date: 08/10/2020  -     famotidine (PEPCID) 40 MG tablet; Take 1 tablet (40 mg total) by mouth once daily.  Dispense: 30 tablet; Refill: 5    Type 2 diabetes mellitus with  hyperglycemia, without long-term current use of insulin    Anxiety    She has not started the fluoxetine ordered by ANGIE Blackburn on 08/10/2020. I have advised to start to help with managing her anxiety.   Advised to get labs ordered by PCP this week as well as her xray. She verbalized understanding.   Medication List with Changes/Refills   New Medications    FAMOTIDINE (PEPCID) 40 MG TABLET    Take 1 tablet (40 mg total) by mouth once daily.   Current Medications    ACETAMINOPHEN (TYLENOL) 500 MG TABLET    Take 2 tablets by mouth Once daily as needed.    ALBUTEROL (VENTOLIN HFA) 90 MCG/ACTUATION INHALER    Inhale 2 puffs into the lungs every 6 (six) hours as needed for Wheezing. RescueINHALE 2 PUFFS INTO THE LUNGS EVERY 6 (SIX) HOURS AS NEEDED FOR WHEEZING. RESCUE    ALPRAZOLAM (XANAX) 0.25 MG TABLET    Take 1 tablet (0.25 mg total) by mouth 2 (two) times daily as needed for Anxiety.    AMLODIPINE (NORVASC) 10 MG TABLET    Take 1 tablet (10 mg total) by mouth once daily.    ATORVASTATIN (LIPITOR) 20 MG TABLET    TAKE 1 TABLET BY MOUTH EVERY DAY    AZELASTINE (ASTELIN) 137 MCG (0.1 %) NASAL SPRAY    1 spray (137 mcg total) by Nasal route 2 (two) times daily.    BLOOD SUGAR DIAGNOSTIC STRP    For daily and prn checks.  True Matrix meter.    BLOOD-GLUCOSE METER (BLOOD GLUCOSE MONITORING) KIT    Use as instructed    ERGOCALCIFEROL (VITAMIN D2) 50,000 UNIT CAP    TAKE ONE CAPSULE BY MOUTH ONE TIME PER WEEK    FLUOXETINE 10 MG CAPSULE    Take 1 capsule (10 mg total) by mouth once daily.    FLUTICASONE (FLONASE) 50 MCG/ACTUATION NASAL SPRAY    2 sprays (100 mcg total) by Each Nare route once daily.    FLUTICASONE-SALMETEROL DISKUS INHALER 250-50 MCG    Inhale 1 puff into the lungs 2 (two) times daily. Controller    IBUPROFEN (ADVIL,MOTRIN) 200 MG TABLET    Take 200 mg by mouth every 6 (six) hours as needed.      METFORMIN (GLUCOPHAGE) 500 MG TABLET    Take 500 mg by mouth once daily.    MUPIROCIN (BACTROBAN) 2 % OINTMENT     Apply topically as needed.    NAPROXEN (NAPROSYN) 500 MG TABLET    Take 1 tablet (500 mg total) by mouth 2 (two) times daily with meals.    PROPRANOLOL (INDERAL LA) 160 MG 24 HR CAPSULE    TAKE 1 CAPSULE (160 MG TOTAL) BY MOUTH ONCE DAILY.    TOBRAMYCIN SULFATE 0.3% (TOBREX) 0.3 % OPHTHALMIC SOLUTION    Place 1 drop into the left eye every 4 (four) hours.    TRIAMTERENE-HYDROCHLOROTHIAZIDE 37.5-25 MG (DYAZIDE) 37.5-25 MG PER CAPSULE    TAKE 1 CAPSULE BY MOUTH EVERY DAY   Discontinued Medications    ESOMEPRAZOLE (NEXIUM) 40 MG CAPSULE    Take 1 capsule (40 mg total) by mouth before breakfast.    LEVOCETIRIZINE (XYZAL) 5 MG TABLET    Take 1 tablet (5 mg total) by mouth every evening.         No follow-ups on file.

## 2020-08-11 ENCOUNTER — PATIENT MESSAGE (OUTPATIENT)
Dept: FAMILY MEDICINE | Facility: CLINIC | Age: 45
End: 2020-08-11

## 2020-08-12 ENCOUNTER — PATIENT MESSAGE (OUTPATIENT)
Dept: FAMILY MEDICINE | Facility: CLINIC | Age: 45
End: 2020-08-12

## 2020-08-13 DIAGNOSIS — R11.0 NAUSEA: Primary | ICD-10-CM

## 2020-08-13 RX ORDER — ONDANSETRON 8 MG/1
8 TABLET, ORALLY DISINTEGRATING ORAL EVERY 12 HOURS PRN
Qty: 30 TABLET | Refills: 0 | Status: SHIPPED | OUTPATIENT
Start: 2020-08-13 | End: 2021-05-31

## 2020-08-31 ENCOUNTER — OFFICE VISIT (OUTPATIENT)
Dept: FAMILY MEDICINE | Facility: CLINIC | Age: 45
End: 2020-08-31
Payer: COMMERCIAL

## 2020-08-31 DIAGNOSIS — Z09 HOSPITAL DISCHARGE FOLLOW-UP: Primary | ICD-10-CM

## 2020-08-31 DIAGNOSIS — Z87.440 RECENT URINARY TRACT INFECTION: Primary | ICD-10-CM

## 2020-08-31 DIAGNOSIS — B37.31 VAGINAL CANDIDA: ICD-10-CM

## 2020-08-31 DIAGNOSIS — N39.0 URINARY TRACT INFECTION WITHOUT HEMATURIA, SITE UNSPECIFIED: ICD-10-CM

## 2020-08-31 DIAGNOSIS — F41.9 ANXIETY: ICD-10-CM

## 2020-08-31 PROCEDURE — 99213 PR OFFICE/OUTPT VISIT, EST, LEVL III, 20-29 MIN: ICD-10-PCS | Mod: 95,,, | Performed by: NURSE PRACTITIONER

## 2020-08-31 PROCEDURE — 99213 OFFICE O/P EST LOW 20 MIN: CPT | Mod: 95,,, | Performed by: NURSE PRACTITIONER

## 2020-08-31 RX ORDER — FLUCONAZOLE 150 MG/1
150 TABLET ORAL DAILY
Qty: 3 TABLET | Refills: 0 | Status: SHIPPED | OUTPATIENT
Start: 2020-08-31 | End: 2020-09-03

## 2020-08-31 NOTE — PROGRESS NOTES
The patient location is:  [place]  The chief complaint leading to consultation is:  Hospital follow up    Visit type: audiovisual    Face to Face time with patient: 12 minutes  20  minutes of total time spent on the encounter, which includes face to face time and non-face to face time preparing to see the patient (eg, review of tests), Obtaining and/or reviewing separately obtained history, Documenting clinical information in the electronic or other health record, Independently interpreting results (not separately reported) and communicating results to the patient/family/caregiver, or Care coordination (not separately reported).     Each patient to whom he or she provides medical services by telemedicine is:  (1) informed of the relationship between the physician and patient and the respective role of any other health care provider with respect to management of the patient; and (2) notified that he or she may decline to receive medical services by telemedicine and may withdraw from such care at any time.    Subjective:       Patient ID: Noni Lazo is a 45 y.o. female.    Chief Complaint: Hospital follow up. She went to hospital with UTI symptoms on 8/12. Records are reviewed. States she finished the antibiotics. Feels that she might have a yeast infection from the antibiotics. She is seeing her gynecologist in the morning, Dr. Barrera. Will request copy of those records in about 2 weeks. The hospital did not do urine culture that I can find. She states she is hydrating well. She is bad about drinking cokes. Advised to incorporate more water, less coke.     She that she is stable on the 10mg prozac. Anxiety is getting better. She denies any other concerns. See ROS.    HPI     The following portion of the patients history was reviewed and updated as appropriate: allergies, current medications, past medical and surgical history. Past social history and problem list reviewed. Family PMH and Past social history  reviewed. Tobacco, Illicit drug use reviewed.      Review of patient's allergies indicates:   Allergen Reactions    Biaxin [clarithromycin]     Epinephrine          Current Outpatient Medications:     acetaminophen (TYLENOL) 500 MG tablet, Take 2 tablets by mouth Once daily as needed., Disp: , Rfl:     albuterol (VENTOLIN HFA) 90 mcg/actuation inhaler, Inhale 2 puffs into the lungs every 6 (six) hours as needed for Wheezing. RescueINHALE 2 PUFFS INTO THE LUNGS EVERY 6 (SIX) HOURS AS NEEDED FOR WHEEZING. RESCUE, Disp: 18 Inhaler, Rfl: 3    ALPRAZolam (XANAX) 0.25 MG tablet, Take 1 tablet (0.25 mg total) by mouth 2 (two) times daily as needed for Anxiety., Disp: 20 tablet, Rfl: 0    amLODIPine (NORVASC) 10 MG tablet, Take 1 tablet (10 mg total) by mouth once daily., Disp: 30 tablet, Rfl: 1    atorvastatin (LIPITOR) 20 MG tablet, TAKE 1 TABLET BY MOUTH EVERY DAY, Disp: 90 tablet, Rfl: 1    azelastine (ASTELIN) 137 mcg (0.1 %) nasal spray, 1 spray (137 mcg total) by Nasal route 2 (two) times daily., Disp: 30 mL, Rfl: 5    blood sugar diagnostic Strp, For daily and prn checks.  True Matrix meter., Disp: 45 strip, Rfl: 11    blood-glucose meter (BLOOD GLUCOSE MONITORING) kit, Use as instructed, Disp: 1 each, Rfl: 0    ergocalciferol (VITAMIN D2) 50,000 unit Cap, TAKE ONE CAPSULE BY MOUTH ONE TIME PER WEEK, Disp: 12 capsule, Rfl: 3    famotidine (PEPCID) 40 MG tablet, Take 1 tablet (40 mg total) by mouth once daily., Disp: 30 tablet, Rfl: 5    FLUoxetine 10 MG capsule, Take 1 capsule (10 mg total) by mouth once daily. (Patient not taking: Reported on 8/10/2020), Disp: 30 capsule, Rfl: 2    fluticasone (FLONASE) 50 mcg/actuation nasal spray, 2 sprays (100 mcg total) by Each Nare route once daily., Disp: 16 g, Rfl: 2    fluticasone-salmeterol diskus inhaler 250-50 mcg, Inhale 1 puff into the lungs 2 (two) times daily. Controller, Disp: 60 each, Rfl: 3    ibuprofen (ADVIL,MOTRIN) 200 MG tablet, Take 200 mg by  mouth every 6 (six) hours as needed.  , Disp: , Rfl:     metFORMIN (GLUCOPHAGE) 500 MG tablet, Take 500 mg by mouth once daily., Disp: , Rfl: 3    mupirocin (BACTROBAN) 2 % ointment, Apply topically as needed., Disp: 30 g, Rfl: 1    naproxen (NAPROSYN) 500 MG tablet, Take 1 tablet (500 mg total) by mouth 2 (two) times daily with meals., Disp: 40 tablet, Rfl: 1    ondansetron (ZOFRAN-ODT) 8 MG TbDL, Take 1 tablet (8 mg total) by mouth every 12 (twelve) hours as needed (nausea or vomiting)., Disp: 30 tablet, Rfl: 0    propranoloL (INDERAL LA) 160 mg 24 hr capsule, TAKE 1 CAPSULE (160 MG TOTAL) BY MOUTH ONCE DAILY., Disp: 90 capsule, Rfl: 1    tobramycin sulfate 0.3% (TOBREX) 0.3 % ophthalmic solution, Place 1 drop into the left eye every 4 (four) hours., Disp: 5 mL, Rfl: 0    triamterene-hydrochlorothiazide 37.5-25 mg (DYAZIDE) 37.5-25 mg per capsule, TAKE 1 CAPSULE BY MOUTH EVERY DAY, Disp: 90 capsule, Rfl: 1    Current Facility-Administered Medications:     dexamethasone injection 4 mg, 4 mg, Intramuscular, Q6H, Batool Blackburn NP, 4 mg at 06/19/19 1304    Past Medical History:   Diagnosis Date    Anxiety     Diabetes     Heart murmur     Hypertension        Past Surgical History:   Procedure Laterality Date    WISDOM TOOTH EXTRACTION         Social History     Socioeconomic History    Marital status: Single     Spouse name: Not on file    Number of children: Not on file    Years of education: Not on file    Highest education level: Not on file   Occupational History    Not on file   Social Needs    Financial resource strain: Not very hard    Food insecurity     Worry: Never true     Inability: Never true    Transportation needs     Medical: No     Non-medical: No   Tobacco Use    Smoking status: Never Smoker    Smokeless tobacco: Never Used   Substance and Sexual Activity    Alcohol use: No     Frequency: Never     Drinks per session: 1 or 2     Binge frequency: Never    Drug  use: Not Currently    Sexual activity: Not Currently   Lifestyle    Physical activity     Days per week: 2 days     Minutes per session: 10 min    Stress: Very much   Relationships    Social connections     Talks on phone: More than three times a week     Gets together: More than three times a week     Attends [employer] service: Not on file     Active member of club or organization: Yes     Attends meetings of clubs or organizations: More than 4 times per year     Relationship status: Never    Other Topics Concern    Not on file   Social History Narrative    Not on file     Review of Systems   Constitutional: Negative for fatigue and fever.   HENT: Negative for congestion, postnasal drip, rhinorrhea and voice change.    Respiratory: Negative for cough, chest tightness, shortness of breath and wheezing.    Cardiovascular: Negative for chest pain and palpitations.   Gastrointestinal: Negative for abdominal pain, diarrhea, nausea and vomiting.   Genitourinary: Negative for difficulty urinating, dysuria, frequency and urgency.        Vaginal itching   Musculoskeletal: Negative for arthralgias, back pain and gait problem.   Neurological: Negative for headaches.   Psychiatric/Behavioral: Negative for decreased concentration, dysphoric mood and sleep disturbance. The patient is not nervous/anxious.        Objective:       Physical Exam  Constitutional:       General: She is not in acute distress.     Appearance: She is well-developed.   HENT:      Head: Normocephalic and atraumatic.   Eyes:      Conjunctiva/sclera: Conjunctivae normal.   Neck:      Musculoskeletal: Normal range of motion.   Pulmonary:      Effort: Pulmonary effort is normal. No accessory muscle usage.      Breath sounds: No wheezing (no audible wheezing).   Musculoskeletal:      Comments: Coordination appears normal per video   Skin:     General: Skin is dry.      Findings: No rash.      Comments: No bruising or rash noted. Patient is not pale    Neurological:      Mental Status: She is alert and oriented to person, place, and time.   Psychiatric:         Speech: Speech normal.         Behavior: Behavior normal.         Thought Content: Thought content normal.         Assessment:       1. Hospital discharge follow-up    2. Urinary tract infection without hematuria, site unspecified    3. Vaginal candida    4. Anxiety        Plan:       Hospital discharge follow-up: hospital visit records reviewed.    Urinary tract infection without hematuria, site unspecified: resolved. No culture was done.    Vaginal candida: send in diflucan. She has appointment with Dr. Barrera her gynecologist in the morning.    Anxiety: stable on current dose of prozac    Other orders  -     fluconazole (DIFLUCAN) 150 MG Tab; Take 1 tablet (150 mg total) by mouth once daily. for 3 days  Dispense: 3 tablet; Refill: 0       Continue current medication  Take medications only as prescribed  Healthy diet, exercise  Adequate rest  Adequate hydration  Avoid allergens  Avoid excessive caffeine     follow up as needed.

## 2020-09-01 LAB
HUMAN PAPILLOMAVIRUS (HPV): NORMAL
HUMAN PAPILLOMAVIRUS (HPV): NORMAL
PAP SMEAR: NORMAL

## 2020-09-02 RX ORDER — AMLODIPINE BESYLATE 10 MG/1
10 TABLET ORAL DAILY
Qty: 30 TABLET | Refills: 1 | Status: SHIPPED | OUTPATIENT
Start: 2020-09-02 | End: 2020-09-11

## 2020-10-05 ENCOUNTER — PATIENT MESSAGE (OUTPATIENT)
Dept: FAMILY MEDICINE | Facility: CLINIC | Age: 45
End: 2020-10-05

## 2020-10-15 ENCOUNTER — PATIENT MESSAGE (OUTPATIENT)
Dept: FAMILY MEDICINE | Facility: CLINIC | Age: 45
End: 2020-10-15

## 2020-10-22 ENCOUNTER — PATIENT MESSAGE (OUTPATIENT)
Dept: FAMILY MEDICINE | Facility: CLINIC | Age: 45
End: 2020-10-22

## 2020-10-26 ENCOUNTER — PATIENT MESSAGE (OUTPATIENT)
Dept: FAMILY MEDICINE | Facility: CLINIC | Age: 45
End: 2020-10-26

## 2020-10-27 DIAGNOSIS — J30.2 SEASONAL ALLERGIC RHINITIS, UNSPECIFIED TRIGGER: ICD-10-CM

## 2020-10-27 RX ORDER — METFORMIN HYDROCHLORIDE 500 MG/1
500 TABLET ORAL DAILY
Qty: 90 TABLET | Refills: 3 | Status: SHIPPED | OUTPATIENT
Start: 2020-10-27 | End: 2022-04-11 | Stop reason: SDUPTHER

## 2020-10-27 RX ORDER — MUPIROCIN 20 MG/G
OINTMENT TOPICAL
Qty: 30 G | Refills: 1 | Status: SHIPPED | OUTPATIENT
Start: 2020-10-27 | End: 2021-07-17 | Stop reason: SDUPTHER

## 2020-10-27 RX ORDER — NAPROXEN 500 MG/1
500 TABLET ORAL 2 TIMES DAILY WITH MEALS
Qty: 40 TABLET | Refills: 1 | Status: SHIPPED | OUTPATIENT
Start: 2020-10-27 | End: 2021-05-31

## 2020-10-27 RX ORDER — ALPRAZOLAM 0.25 MG/1
0.25 TABLET ORAL 2 TIMES DAILY PRN
Qty: 20 TABLET | Refills: 0 | OUTPATIENT
Start: 2020-10-27 | End: 2020-11-26

## 2020-10-27 NOTE — TELEPHONE ENCOUNTER
She requested refill on the xanax. New policy is that I can only give this to her every 6 months. She cannot have refill until December.

## 2020-10-28 RX ORDER — AZELASTINE 1 MG/ML
1 SPRAY, METERED NASAL 2 TIMES DAILY
Qty: 30 ML | Refills: 5 | Status: SHIPPED | OUTPATIENT
Start: 2020-10-28 | End: 2021-05-31 | Stop reason: SDUPTHER

## 2020-11-05 ENCOUNTER — PATIENT MESSAGE (OUTPATIENT)
Dept: FAMILY MEDICINE | Facility: CLINIC | Age: 45
End: 2020-11-05

## 2020-11-05 DIAGNOSIS — R10.11 RUQ ABDOMINAL PAIN: Primary | ICD-10-CM

## 2020-11-08 ENCOUNTER — PATIENT MESSAGE (OUTPATIENT)
Dept: FAMILY MEDICINE | Facility: CLINIC | Age: 45
End: 2020-11-08

## 2021-01-04 ENCOUNTER — PATIENT MESSAGE (OUTPATIENT)
Dept: ADMINISTRATIVE | Facility: HOSPITAL | Age: 46
End: 2021-01-04

## 2021-01-07 ENCOUNTER — PATIENT MESSAGE (OUTPATIENT)
Dept: FAMILY MEDICINE | Facility: CLINIC | Age: 46
End: 2021-01-07

## 2021-01-08 RX ORDER — ALPRAZOLAM 0.25 MG/1
0.25 TABLET ORAL 2 TIMES DAILY PRN
Qty: 30 TABLET | Refills: 0 | Status: SHIPPED | OUTPATIENT
Start: 2021-01-08 | End: 2021-07-17 | Stop reason: SDUPTHER

## 2021-03-12 ENCOUNTER — PATIENT MESSAGE (OUTPATIENT)
Dept: FAMILY MEDICINE | Facility: CLINIC | Age: 46
End: 2021-03-12

## 2021-04-15 ENCOUNTER — PATIENT MESSAGE (OUTPATIENT)
Dept: FAMILY MEDICINE | Facility: CLINIC | Age: 46
End: 2021-04-15

## 2021-05-31 ENCOUNTER — OFFICE VISIT (OUTPATIENT)
Dept: FAMILY MEDICINE | Facility: CLINIC | Age: 46
End: 2021-05-31
Payer: COMMERCIAL

## 2021-05-31 ENCOUNTER — PATIENT MESSAGE (OUTPATIENT)
Dept: FAMILY MEDICINE | Facility: CLINIC | Age: 46
End: 2021-05-31

## 2021-05-31 DIAGNOSIS — J01.40 ACUTE NON-RECURRENT PANSINUSITIS: Primary | ICD-10-CM

## 2021-05-31 DIAGNOSIS — J30.2 SEASONAL ALLERGIC RHINITIS, UNSPECIFIED TRIGGER: ICD-10-CM

## 2021-05-31 PROCEDURE — 99213 OFFICE O/P EST LOW 20 MIN: CPT | Mod: 95,,, | Performed by: PHYSICIAN ASSISTANT

## 2021-05-31 PROCEDURE — 99213 PR OFFICE/OUTPT VISIT, EST, LEVL III, 20-29 MIN: ICD-10-PCS | Mod: 95,,, | Performed by: PHYSICIAN ASSISTANT

## 2021-05-31 RX ORDER — AMOXICILLIN AND CLAVULANATE POTASSIUM 875; 125 MG/1; MG/1
1 TABLET, FILM COATED ORAL EVERY 12 HOURS
Qty: 14 TABLET | Refills: 0 | Status: SHIPPED | OUTPATIENT
Start: 2021-05-31 | End: 2021-06-01 | Stop reason: SDUPTHER

## 2021-05-31 RX ORDER — AZELASTINE 1 MG/ML
1 SPRAY, METERED NASAL 2 TIMES DAILY
Qty: 30 ML | Refills: 5 | Status: SHIPPED | OUTPATIENT
Start: 2021-05-31 | End: 2022-06-02

## 2021-06-01 ENCOUNTER — PATIENT MESSAGE (OUTPATIENT)
Dept: FAMILY MEDICINE | Facility: CLINIC | Age: 46
End: 2021-06-01

## 2021-06-01 DIAGNOSIS — J01.40 ACUTE NON-RECURRENT PANSINUSITIS: ICD-10-CM

## 2021-06-01 RX ORDER — AMOXICILLIN AND CLAVULANATE POTASSIUM 875; 125 MG/1; MG/1
1 TABLET, FILM COATED ORAL EVERY 12 HOURS
Qty: 14 TABLET | Refills: 0 | Status: SHIPPED | OUTPATIENT
Start: 2021-06-01 | End: 2021-06-08

## 2021-06-20 ENCOUNTER — PATIENT MESSAGE (OUTPATIENT)
Dept: FAMILY MEDICINE | Facility: CLINIC | Age: 46
End: 2021-06-20

## 2021-06-20 DIAGNOSIS — H61.20 IMPACTED CERUMEN, UNSPECIFIED LATERALITY: Primary | ICD-10-CM

## 2021-07-02 ENCOUNTER — TELEPHONE (OUTPATIENT)
Dept: OTOLARYNGOLOGY | Facility: CLINIC | Age: 46
End: 2021-07-02

## 2021-07-02 ENCOUNTER — OFFICE VISIT (OUTPATIENT)
Dept: OTOLARYNGOLOGY | Facility: CLINIC | Age: 46
End: 2021-07-02
Payer: COMMERCIAL

## 2021-07-02 ENCOUNTER — HOSPITAL ENCOUNTER (OUTPATIENT)
Dept: RADIOLOGY | Facility: HOSPITAL | Age: 46
Discharge: HOME | End: 2021-07-02
Attending: NURSE PRACTITIONER
Payer: COMMERCIAL

## 2021-07-02 VITALS — WEIGHT: 184.31 LBS | BODY MASS INDEX: 32.65 KG/M2

## 2021-07-02 DIAGNOSIS — J34.89 SINUS PRESSURE: Primary | ICD-10-CM

## 2021-07-02 DIAGNOSIS — H61.23 BILATERAL IMPACTED CERUMEN: ICD-10-CM

## 2021-07-02 DIAGNOSIS — R93.0 OPACIFICATION OF LEFT MAXILLARY SINUS: ICD-10-CM

## 2021-07-02 DIAGNOSIS — J30.9 ALLERGIC RHINITIS, UNSPECIFIED SEASONALITY, UNSPECIFIED TRIGGER: ICD-10-CM

## 2021-07-02 DIAGNOSIS — H61.20 IMPACTED CERUMEN, UNSPECIFIED LATERALITY: ICD-10-CM

## 2021-07-02 DIAGNOSIS — J34.89 SINUS PRESSURE: ICD-10-CM

## 2021-07-02 PROCEDURE — 99203 PR OFFICE/OUTPT VISIT, NEW, LEVL III, 30-44 MIN: ICD-10-PCS | Mod: 25,S$GLB,, | Performed by: NURSE PRACTITIONER

## 2021-07-02 PROCEDURE — 70220 X-RAY EXAM OF SINUSES: CPT | Mod: TC,FY,PO

## 2021-07-02 PROCEDURE — 99999 PR PBB SHADOW E&M-EST. PATIENT-LVL IV: ICD-10-PCS | Mod: PBBFAC,,, | Performed by: NURSE PRACTITIONER

## 2021-07-02 PROCEDURE — 70220 X-RAY EXAM OF SINUSES: CPT | Mod: 26,,, | Performed by: RADIOLOGY

## 2021-07-02 PROCEDURE — 99999 PR PBB SHADOW E&M-EST. PATIENT-LVL IV: CPT | Mod: PBBFAC,,, | Performed by: NURSE PRACTITIONER

## 2021-07-02 PROCEDURE — 70220 XR SINUSES MIN 3 VIEWS: ICD-10-PCS | Mod: 26,,, | Performed by: RADIOLOGY

## 2021-07-02 PROCEDURE — 99203 OFFICE O/P NEW LOW 30 MIN: CPT | Mod: 25,S$GLB,, | Performed by: NURSE PRACTITIONER

## 2021-07-02 PROCEDURE — 1126F AMNT PAIN NOTED NONE PRSNT: CPT | Mod: S$GLB,,, | Performed by: NURSE PRACTITIONER

## 2021-07-02 PROCEDURE — 3008F BODY MASS INDEX DOCD: CPT | Mod: CPTII,S$GLB,, | Performed by: NURSE PRACTITIONER

## 2021-07-02 PROCEDURE — 1126F PR PAIN SEVERITY QUANTIFIED, NO PAIN PRESENT: ICD-10-PCS | Mod: S$GLB,,, | Performed by: NURSE PRACTITIONER

## 2021-07-02 PROCEDURE — 69210 REMOVE IMPACTED EAR WAX UNI: CPT | Mod: S$GLB,,, | Performed by: NURSE PRACTITIONER

## 2021-07-02 PROCEDURE — 69210 PR REMOVAL IMPACTED CERUMEN REQUIRING INSTRUMENTATION, UNILATERAL: ICD-10-PCS | Mod: S$GLB,,, | Performed by: NURSE PRACTITIONER

## 2021-07-02 PROCEDURE — 3008F PR BODY MASS INDEX (BMI) DOCUMENTED: ICD-10-PCS | Mod: CPTII,S$GLB,, | Performed by: NURSE PRACTITIONER

## 2021-07-02 RX ORDER — CEFDINIR 300 MG/1
600 CAPSULE ORAL DAILY
Qty: 24 CAPSULE | Refills: 0 | Status: SHIPPED | OUTPATIENT
Start: 2021-07-02 | End: 2021-07-14

## 2021-07-17 DIAGNOSIS — J45.909 ASTHMA, UNSPECIFIED ASTHMA SEVERITY, UNSPECIFIED WHETHER COMPLICATED, UNSPECIFIED WHETHER PERSISTENT: ICD-10-CM

## 2021-07-19 RX ORDER — MUPIROCIN 20 MG/G
OINTMENT TOPICAL
Qty: 30 G | Refills: 1 | Status: SHIPPED | OUTPATIENT
Start: 2021-07-19 | End: 2021-07-28 | Stop reason: SDUPTHER

## 2021-07-19 RX ORDER — FLUTICASONE PROPIONATE AND SALMETEROL 250; 50 UG/1; UG/1
1 POWDER RESPIRATORY (INHALATION) 2 TIMES DAILY
Qty: 60 EACH | Refills: 3 | Status: SHIPPED | OUTPATIENT
Start: 2021-07-19 | End: 2021-07-28 | Stop reason: SDUPTHER

## 2021-07-19 RX ORDER — ALPRAZOLAM 0.25 MG/1
0.25 TABLET ORAL 2 TIMES DAILY PRN
Qty: 30 TABLET | Refills: 0 | Status: SHIPPED | OUTPATIENT
Start: 2021-07-19 | End: 2021-07-28 | Stop reason: SDUPTHER

## 2021-07-19 RX ORDER — FLUTICASONE PROPIONATE 50 MCG
2 SPRAY, SUSPENSION (ML) NASAL DAILY
Qty: 16 G | Refills: 2 | Status: SHIPPED | OUTPATIENT
Start: 2021-07-19 | End: 2022-04-11 | Stop reason: SDUPTHER

## 2021-07-19 RX ORDER — ERGOCALCIFEROL 1.25 MG/1
CAPSULE ORAL
Qty: 12 CAPSULE | Refills: 3 | Status: SHIPPED | OUTPATIENT
Start: 2021-07-19 | End: 2021-07-28 | Stop reason: SDUPTHER

## 2021-07-19 RX ORDER — FLUTICASONE PROPIONATE 50 MCG
2 SPRAY, SUSPENSION (ML) NASAL DAILY
Qty: 16 G | Refills: 2 | Status: SHIPPED | OUTPATIENT
Start: 2021-07-19 | End: 2021-07-19 | Stop reason: SDUPTHER

## 2021-07-19 RX ORDER — TRIAMTERENE AND HYDROCHLOROTHIAZIDE 37.5; 25 MG/1; MG/1
1 CAPSULE ORAL DAILY
Qty: 90 CAPSULE | Refills: 0 | Status: SHIPPED | OUTPATIENT
Start: 2021-07-19 | End: 2021-07-28 | Stop reason: SDUPTHER

## 2021-07-28 ENCOUNTER — PATIENT MESSAGE (OUTPATIENT)
Dept: FAMILY MEDICINE | Facility: CLINIC | Age: 46
End: 2021-07-28

## 2021-07-28 DIAGNOSIS — J45.909 ASTHMA, UNSPECIFIED ASTHMA SEVERITY, UNSPECIFIED WHETHER COMPLICATED, UNSPECIFIED WHETHER PERSISTENT: ICD-10-CM

## 2021-07-28 RX ORDER — FLUTICASONE PROPIONATE AND SALMETEROL 250; 50 UG/1; UG/1
1 POWDER RESPIRATORY (INHALATION) 2 TIMES DAILY
Qty: 60 EACH | Refills: 3 | Status: SHIPPED | OUTPATIENT
Start: 2021-07-28 | End: 2022-04-11 | Stop reason: SDUPTHER

## 2021-07-28 RX ORDER — ERGOCALCIFEROL 1.25 MG/1
CAPSULE ORAL
Qty: 12 CAPSULE | Refills: 3 | Status: SHIPPED | OUTPATIENT
Start: 2021-07-28 | End: 2022-04-11 | Stop reason: SDUPTHER

## 2021-07-28 RX ORDER — SULFAMETHOXAZOLE AND TRIMETHOPRIM 800; 160 MG/1; MG/1
1 TABLET ORAL 2 TIMES DAILY
Qty: 10 TABLET | Refills: 0 | Status: SHIPPED | OUTPATIENT
Start: 2021-07-28 | End: 2021-07-28 | Stop reason: SDUPTHER

## 2021-07-28 RX ORDER — NITROFURANTOIN (MACROCRYSTALS) 100 MG/1
100 CAPSULE ORAL EVERY 12 HOURS
Qty: 14 CAPSULE | Refills: 0 | Status: SHIPPED | OUTPATIENT
Start: 2021-07-28 | End: 2022-04-11 | Stop reason: ALTCHOICE

## 2021-07-28 RX ORDER — MUPIROCIN 20 MG/G
OINTMENT TOPICAL
Qty: 30 G | Refills: 1 | Status: SHIPPED | OUTPATIENT
Start: 2021-07-28 | End: 2023-06-11 | Stop reason: SDUPTHER

## 2021-07-28 RX ORDER — ALPRAZOLAM 0.25 MG/1
0.25 TABLET ORAL 2 TIMES DAILY PRN
Qty: 30 TABLET | Refills: 0 | Status: SHIPPED | OUTPATIENT
Start: 2021-07-28 | End: 2022-06-06 | Stop reason: SDUPTHER

## 2021-07-28 RX ORDER — TRIAMTERENE AND HYDROCHLOROTHIAZIDE 37.5; 25 MG/1; MG/1
1 CAPSULE ORAL DAILY
Qty: 90 CAPSULE | Refills: 1 | Status: SHIPPED | OUTPATIENT
Start: 2021-07-28 | End: 2022-04-11 | Stop reason: SDUPTHER

## 2021-07-29 ENCOUNTER — TELEPHONE (OUTPATIENT)
Dept: FAMILY MEDICINE | Facility: CLINIC | Age: 46
End: 2021-07-29

## 2021-08-21 RX ORDER — PROPRANOLOL HYDROCHLORIDE 160 MG/1
160 CAPSULE, EXTENDED RELEASE ORAL DAILY
Qty: 90 CAPSULE | Refills: 1 | Status: CANCELLED | OUTPATIENT
Start: 2021-08-21

## 2021-08-23 RX ORDER — PROPRANOLOL HYDROCHLORIDE 160 MG/1
160 CAPSULE, EXTENDED RELEASE ORAL DAILY
Qty: 90 CAPSULE | Refills: 0 | Status: SHIPPED | OUTPATIENT
Start: 2021-08-23 | End: 2021-11-07 | Stop reason: SDUPTHER

## 2021-10-13 ENCOUNTER — PATIENT MESSAGE (OUTPATIENT)
Dept: FAMILY MEDICINE | Facility: CLINIC | Age: 46
End: 2021-10-13

## 2021-10-13 DIAGNOSIS — Z11.59 NEED FOR HEPATITIS C SCREENING TEST: ICD-10-CM

## 2021-10-13 DIAGNOSIS — Z00.00 LABORATORY EXAM ORDERED AS PART OF ROUTINE GENERAL MEDICAL EXAMINATION: Primary | ICD-10-CM

## 2021-10-13 RX ORDER — AMLODIPINE BESYLATE 10 MG/1
TABLET ORAL
Qty: 30 TABLET | Refills: 0 | Status: SHIPPED | OUTPATIENT
Start: 2021-10-13 | End: 2021-11-05

## 2021-10-13 RX ORDER — ATORVASTATIN CALCIUM 20 MG/1
TABLET, FILM COATED ORAL
Qty: 30 TABLET | Refills: 0 | Status: SHIPPED | OUTPATIENT
Start: 2021-10-13 | End: 2021-11-05

## 2021-11-05 ENCOUNTER — PATIENT MESSAGE (OUTPATIENT)
Dept: FAMILY MEDICINE | Facility: CLINIC | Age: 46
End: 2021-11-05
Payer: COMMERCIAL

## 2021-11-05 RX ORDER — ATORVASTATIN CALCIUM 20 MG/1
TABLET, FILM COATED ORAL
Qty: 30 TABLET | Refills: 0 | Status: SHIPPED | OUTPATIENT
Start: 2021-11-05 | End: 2021-11-30

## 2021-11-05 RX ORDER — AMLODIPINE BESYLATE 10 MG/1
TABLET ORAL
Qty: 30 TABLET | Refills: 0 | Status: SHIPPED | OUTPATIENT
Start: 2021-11-05 | End: 2021-11-30

## 2021-11-08 RX ORDER — PROPRANOLOL HYDROCHLORIDE 160 MG/1
160 CAPSULE, EXTENDED RELEASE ORAL DAILY
Qty: 30 CAPSULE | Refills: 0 | Status: SHIPPED | OUTPATIENT
Start: 2021-11-08 | End: 2021-12-27

## 2021-11-26 RX ORDER — PROPRANOLOL HYDROCHLORIDE 160 MG/1
160 CAPSULE, EXTENDED RELEASE ORAL DAILY
Qty: 30 CAPSULE | Refills: 0 | Status: SHIPPED | OUTPATIENT
Start: 2021-11-26 | End: 2022-04-11 | Stop reason: SDUPTHER

## 2021-12-03 ENCOUNTER — PATIENT MESSAGE (OUTPATIENT)
Dept: OTOLARYNGOLOGY | Facility: CLINIC | Age: 46
End: 2021-12-03
Payer: COMMERCIAL

## 2021-12-24 ENCOUNTER — PATIENT MESSAGE (OUTPATIENT)
Dept: FAMILY MEDICINE | Facility: CLINIC | Age: 46
End: 2021-12-24
Payer: COMMERCIAL

## 2021-12-27 ENCOUNTER — PATIENT MESSAGE (OUTPATIENT)
Dept: FAMILY MEDICINE | Facility: CLINIC | Age: 46
End: 2021-12-27
Payer: COMMERCIAL

## 2021-12-27 RX ORDER — AMLODIPINE BESYLATE 10 MG/1
TABLET ORAL
Qty: 30 TABLET | Refills: 0 | Status: SHIPPED | OUTPATIENT
Start: 2021-12-27 | End: 2022-04-11 | Stop reason: SDUPTHER

## 2021-12-27 RX ORDER — ATORVASTATIN CALCIUM 20 MG/1
TABLET, FILM COATED ORAL
Qty: 30 TABLET | Refills: 0 | Status: SHIPPED | OUTPATIENT
Start: 2021-12-27 | End: 2022-04-11 | Stop reason: SDUPTHER

## 2021-12-27 RX ORDER — PROPRANOLOL HYDROCHLORIDE 160 MG/1
160 CAPSULE, EXTENDED RELEASE ORAL DAILY
Qty: 30 CAPSULE | Refills: 0 | Status: SHIPPED | OUTPATIENT
Start: 2021-12-27 | End: 2022-04-11 | Stop reason: SDUPTHER

## 2021-12-27 NOTE — TELEPHONE ENCOUNTER
I put lab orders in last October, those should still be good. Also she is scheduled with another provider, Agueda Cummings, in [place] for Monday, not with me. She has to be seen in the clinic.

## 2021-12-28 ENCOUNTER — PATIENT MESSAGE (OUTPATIENT)
Dept: PRIMARY CARE CLINIC | Facility: CLINIC | Age: 46
End: 2021-12-28
Payer: COMMERCIAL

## 2021-12-29 LAB
ALBUMIN SERPL-MCNC: 4.4 G/DL (ref 3.8–4.8)
ALBUMIN/CREAT UR: 10 MG/G CREAT (ref 0–29)
ALBUMIN/GLOB SERPL: 1.8 {RATIO} (ref 1.2–2.2)
ALP SERPL-CCNC: 86 IU/L (ref 44–121)
ALT SERPL-CCNC: 43 IU/L (ref 0–32)
AST SERPL-CCNC: 21 IU/L (ref 0–40)
BASOPHILS # BLD AUTO: 0.1 X10E3/UL (ref 0–0.2)
BASOPHILS NFR BLD AUTO: 1 %
BILIRUB SERPL-MCNC: 0.3 MG/DL (ref 0–1.2)
BUN SERPL-MCNC: 14 MG/DL (ref 6–24)
BUN/CREAT SERPL: 19 (ref 9–23)
CALCIUM SERPL-MCNC: 9.9 MG/DL (ref 8.7–10.2)
CHLORIDE SERPL-SCNC: 98 MMOL/L (ref 96–106)
CHOLEST SERPL-MCNC: 158 MG/DL (ref 100–199)
CO2 SERPL-SCNC: 26 MMOL/L (ref 20–29)
CREAT SERPL-MCNC: 0.75 MG/DL (ref 0.57–1)
CREAT UR-MCNC: 51.9 MG/DL
EOSINOPHIL # BLD AUTO: 0.2 X10E3/UL (ref 0–0.4)
EOSINOPHIL NFR BLD AUTO: 3 %
ERYTHROCYTE [DISTWIDTH] IN BLOOD BY AUTOMATED COUNT: 13.2 % (ref 11.7–15.4)
GLOBULIN SER CALC-MCNC: 2.4 G/DL (ref 1.5–4.5)
GLUCOSE SERPL-MCNC: 108 MG/DL (ref 65–99)
HBA1C MFR BLD: 6.6 % (ref 4.8–5.6)
HCT VFR BLD AUTO: 41.2 % (ref 34–46.6)
HDLC SERPL-MCNC: 50 MG/DL
HGB BLD-MCNC: 13.1 G/DL (ref 11.1–15.9)
IMM GRANULOCYTES # BLD AUTO: 0 X10E3/UL (ref 0–0.1)
IMM GRANULOCYTES NFR BLD AUTO: 0 %
LDLC SERPL CALC-MCNC: 92 MG/DL (ref 0–99)
LYMPHOCYTES # BLD AUTO: 3.3 X10E3/UL (ref 0.7–3.1)
LYMPHOCYTES NFR BLD AUTO: 42 %
MCH RBC QN AUTO: 26.3 PG (ref 26.6–33)
MCHC RBC AUTO-ENTMCNC: 31.8 G/DL (ref 31.5–35.7)
MCV RBC AUTO: 83 FL (ref 79–97)
MICROALBUMIN UR-MCNC: 5.3 UG/ML
MONOCYTES # BLD AUTO: 0.5 X10E3/UL (ref 0.1–0.9)
MONOCYTES NFR BLD AUTO: 7 %
NEUTROPHILS # BLD AUTO: 3.6 X10E3/UL (ref 1.4–7)
NEUTROPHILS NFR BLD AUTO: 47 %
PLATELET # BLD AUTO: 469 X10E3/UL (ref 150–450)
POTASSIUM SERPL-SCNC: 3.9 MMOL/L (ref 3.5–5.2)
PROT SERPL-MCNC: 6.8 G/DL (ref 6–8.5)
RBC # BLD AUTO: 4.99 X10E6/UL (ref 3.77–5.28)
SODIUM SERPL-SCNC: 139 MMOL/L (ref 134–144)
TRIGL SERPL-MCNC: 85 MG/DL (ref 0–149)
VLDLC SERPL CALC-MCNC: 16 MG/DL (ref 5–40)
WBC # BLD AUTO: 7.8 X10E3/UL (ref 3.4–10.8)

## 2021-12-31 ENCOUNTER — PATIENT MESSAGE (OUTPATIENT)
Dept: FAMILY MEDICINE | Facility: CLINIC | Age: 46
End: 2021-12-31
Payer: COMMERCIAL

## 2022-01-03 ENCOUNTER — PATIENT OUTREACH (OUTPATIENT)
Dept: ADMINISTRATIVE | Facility: HOSPITAL | Age: 47
End: 2022-01-03
Payer: COMMERCIAL

## 2022-01-03 NOTE — LETTER
AUTHORIZATION FOR RELEASE OF   CONFIDENTIAL INFORMATION    [facility]    We are seeing Noni Lazo, date of birth 1975, in the clinic at [facility]. Batool Blackburn NP is the patient's PCP. Noni Lazo has an outstanding lab/procedure at the time we reviewed her chart. In order to help keep her health information updated, she has authorized us to request the following medical record(s):         EYE EXAM                  Please fax records to [facility], Glenda Breakfield Petroff, NP, [phone]     If you have any questions, please contact Ashley Burnett, Care Coordinator   at [phone].        Patient Name: Noni Lazo  : 1975  Patient Phone #: [phone]

## 2022-01-04 ENCOUNTER — PATIENT MESSAGE (OUTPATIENT)
Dept: FAMILY MEDICINE | Facility: CLINIC | Age: 47
End: 2022-01-04
Payer: COMMERCIAL

## 2022-01-19 DIAGNOSIS — E11.65 TYPE 2 DIABETES MELLITUS WITH HYPERGLYCEMIA, WITHOUT LONG-TERM CURRENT USE OF INSULIN: Primary | ICD-10-CM

## 2022-03-03 ENCOUNTER — TELEPHONE (OUTPATIENT)
Dept: OPHTHALMOLOGY | Facility: CLINIC | Age: 47
End: 2022-03-03
Payer: COMMERCIAL

## 2022-03-03 NOTE — TELEPHONE ENCOUNTER
----- Message from Lillian Early sent at 3/3/2022 12:10 PM CST -----  This patient is calling to schedule an appt for corneal cross linking.  Please call her to schedule at [phone].  Thank you

## 2022-03-23 DIAGNOSIS — Z12.31 OTHER SCREENING MAMMOGRAM: ICD-10-CM

## 2022-03-25 ENCOUNTER — PATIENT MESSAGE (OUTPATIENT)
Dept: ADMINISTRATIVE | Facility: HOSPITAL | Age: 47
End: 2022-03-25
Payer: COMMERCIAL

## 2022-03-25 ENCOUNTER — PATIENT OUTREACH (OUTPATIENT)
Dept: ADMINISTRATIVE | Facility: HOSPITAL | Age: 47
End: 2022-03-25
Payer: COMMERCIAL

## 2022-03-29 ENCOUNTER — PATIENT MESSAGE (OUTPATIENT)
Dept: FAMILY MEDICINE | Facility: CLINIC | Age: 47
End: 2022-03-29
Payer: COMMERCIAL

## 2022-04-11 ENCOUNTER — PATIENT MESSAGE (OUTPATIENT)
Dept: FAMILY MEDICINE | Facility: CLINIC | Age: 47
End: 2022-04-11
Payer: COMMERCIAL

## 2022-04-11 DIAGNOSIS — E11.8 TYPE 2 DIABETES MELLITUS WITH COMPLICATION, WITHOUT LONG-TERM CURRENT USE OF INSULIN: ICD-10-CM

## 2022-04-11 DIAGNOSIS — J45.909 ASTHMA, UNSPECIFIED ASTHMA SEVERITY, UNSPECIFIED WHETHER COMPLICATED, UNSPECIFIED WHETHER PERSISTENT: ICD-10-CM

## 2022-04-11 RX ORDER — AMLODIPINE BESYLATE 10 MG/1
10 TABLET ORAL DAILY
Qty: 90 TABLET | Refills: 0 | Status: SHIPPED | OUTPATIENT
Start: 2022-04-11 | End: 2022-06-06 | Stop reason: SDUPTHER

## 2022-04-11 RX ORDER — PROPRANOLOL HYDROCHLORIDE 160 MG/1
160 CAPSULE, EXTENDED RELEASE ORAL DAILY
Qty: 30 CAPSULE | Refills: 0 | Status: CANCELLED | OUTPATIENT
Start: 2022-04-11

## 2022-04-11 RX ORDER — FLUTICASONE PROPIONATE 50 MCG
2 SPRAY, SUSPENSION (ML) NASAL DAILY
Qty: 16 G | Refills: 2 | Status: SHIPPED | OUTPATIENT
Start: 2022-04-11 | End: 2022-07-07 | Stop reason: SDUPTHER

## 2022-04-11 RX ORDER — TRIAMTERENE AND HYDROCHLOROTHIAZIDE 37.5; 25 MG/1; MG/1
1 CAPSULE ORAL DAILY
Qty: 90 CAPSULE | Refills: 1 | Status: CANCELLED | OUTPATIENT
Start: 2022-04-11

## 2022-04-11 RX ORDER — METFORMIN HYDROCHLORIDE 500 MG/1
500 TABLET, EXTENDED RELEASE ORAL
Qty: 90 TABLET | Refills: 3 | Status: SHIPPED | OUTPATIENT
Start: 2022-04-11 | End: 2022-06-06 | Stop reason: SDUPTHER

## 2022-04-11 RX ORDER — ERGOCALCIFEROL 1.25 MG/1
CAPSULE ORAL
Qty: 12 CAPSULE | Refills: 0 | Status: SHIPPED | OUTPATIENT
Start: 2022-04-11 | End: 2022-06-06 | Stop reason: SDUPTHER

## 2022-04-11 RX ORDER — PROPRANOLOL HYDROCHLORIDE 160 MG/1
160 CAPSULE, EXTENDED RELEASE ORAL DAILY
Qty: 90 CAPSULE | Refills: 0 | Status: SHIPPED | OUTPATIENT
Start: 2022-04-11 | End: 2022-06-06 | Stop reason: SDUPTHER

## 2022-04-11 RX ORDER — ATORVASTATIN CALCIUM 20 MG/1
20 TABLET, FILM COATED ORAL DAILY
Qty: 30 TABLET | Refills: 0 | Status: CANCELLED | OUTPATIENT
Start: 2022-04-11

## 2022-04-11 RX ORDER — TRIAMTERENE AND HYDROCHLOROTHIAZIDE 37.5; 25 MG/1; MG/1
1 CAPSULE ORAL DAILY
Qty: 90 CAPSULE | Refills: 0 | Status: SHIPPED | OUTPATIENT
Start: 2022-04-11 | End: 2022-06-06 | Stop reason: SDUPTHER

## 2022-04-11 RX ORDER — FLUTICASONE PROPIONATE AND SALMETEROL 250; 50 UG/1; UG/1
1 POWDER RESPIRATORY (INHALATION) 2 TIMES DAILY
Qty: 60 EACH | Refills: 0 | Status: SHIPPED | OUTPATIENT
Start: 2022-04-11 | End: 2022-12-20 | Stop reason: SDUPTHER

## 2022-04-11 RX ORDER — ATORVASTATIN CALCIUM 20 MG/1
20 TABLET, FILM COATED ORAL DAILY
Qty: 90 TABLET | Refills: 0 | Status: SHIPPED | OUTPATIENT
Start: 2022-04-11 | End: 2022-06-06 | Stop reason: SDUPTHER

## 2022-04-11 RX ORDER — METFORMIN HYDROCHLORIDE 500 MG/1
500 TABLET ORAL DAILY
Qty: 90 TABLET | Refills: 0 | Status: SHIPPED | OUTPATIENT
Start: 2022-04-11 | End: 2022-04-11 | Stop reason: ALTCHOICE

## 2022-04-11 RX ORDER — AMLODIPINE BESYLATE 10 MG/1
10 TABLET ORAL DAILY
Qty: 30 TABLET | Refills: 0 | Status: CANCELLED | OUTPATIENT
Start: 2022-04-11

## 2022-04-11 RX ORDER — INSULIN PUMP SYRINGE, 3 ML
EACH MISCELLANEOUS
Qty: 1 EACH | Refills: 0 | Status: SHIPPED | OUTPATIENT
Start: 2022-04-11 | End: 2022-06-06

## 2022-04-11 RX ORDER — ALPRAZOLAM 0.25 MG/1
0.25 TABLET ORAL 2 TIMES DAILY PRN
Qty: 30 TABLET | Refills: 0 | OUTPATIENT
Start: 2022-04-11 | End: 2022-05-11

## 2022-04-11 NOTE — TELEPHONE ENCOUNTER
Batool, please see pts message about premedicating and advise.    Ashley, please mail fit kit.    Thank ya'll.

## 2022-04-11 NOTE — TELEPHONE ENCOUNTER
Sent to pharmacy. Cannot have xanax since she has not seen me. She can ask the other provider that she saw in January.

## 2022-04-12 ENCOUNTER — PATIENT OUTREACH (OUTPATIENT)
Dept: ADMINISTRATIVE | Facility: HOSPITAL | Age: 47
End: 2022-04-12
Payer: COMMERCIAL

## 2022-04-12 DIAGNOSIS — Z12.11 SCREENING FOR COLON CANCER: Primary | ICD-10-CM

## 2022-05-19 ENCOUNTER — PATIENT OUTREACH (OUTPATIENT)
Dept: ADMINISTRATIVE | Facility: HOSPITAL | Age: 47
End: 2022-05-19
Payer: COMMERCIAL

## 2022-05-19 ENCOUNTER — PATIENT MESSAGE (OUTPATIENT)
Dept: ADMINISTRATIVE | Facility: HOSPITAL | Age: 47
End: 2022-05-19
Payer: COMMERCIAL

## 2022-05-19 NOTE — PROGRESS NOTES
2022 Care Everywhere updates requested and reviewed.  Immunizations reconciled. Media reports reviewed.  Duplicate HM overrides and  orders removed.  Overdue HM topic chart audit and/or requested.  Overdue lab testing linked to upcoming lab appointments if applies.      COMPLETE FIT KIT    MAMMOGRAM SCHEDULED    Health Maintenance Due   Topic Date Due    Hepatitis C Screening  Never done    Foot Exam  Never done    Colorectal Cancer Screening  Never done    Mammogram  03/10/2022

## 2022-05-31 ENCOUNTER — PATIENT MESSAGE (OUTPATIENT)
Dept: ADMINISTRATIVE | Facility: HOSPITAL | Age: 47
End: 2022-05-31
Payer: COMMERCIAL

## 2022-06-06 ENCOUNTER — OFFICE VISIT (OUTPATIENT)
Dept: FAMILY MEDICINE | Facility: CLINIC | Age: 47
End: 2022-06-06
Payer: COMMERCIAL

## 2022-06-06 VITALS
OXYGEN SATURATION: 99 % | TEMPERATURE: 98 F | DIASTOLIC BLOOD PRESSURE: 82 MMHG | SYSTOLIC BLOOD PRESSURE: 118 MMHG | WEIGHT: 179 LBS | BODY MASS INDEX: 31.71 KG/M2 | RESPIRATION RATE: 16 BRPM | HEART RATE: 85 BPM | HEIGHT: 63 IN

## 2022-06-06 DIAGNOSIS — I10 PRIMARY HYPERTENSION: ICD-10-CM

## 2022-06-06 DIAGNOSIS — E66.9 CLASS 1 OBESITY WITH BODY MASS INDEX (BMI) OF 31.0 TO 31.9 IN ADULT, UNSPECIFIED OBESITY TYPE, UNSPECIFIED WHETHER SERIOUS COMORBIDITY PRESENT: ICD-10-CM

## 2022-06-06 DIAGNOSIS — E11.65 TYPE 2 DIABETES MELLITUS WITH HYPERGLYCEMIA, WITHOUT LONG-TERM CURRENT USE OF INSULIN: ICD-10-CM

## 2022-06-06 DIAGNOSIS — Z00.00 ANNUAL PHYSICAL EXAM: Primary | ICD-10-CM

## 2022-06-06 DIAGNOSIS — E78.5 HYPERLIPIDEMIA, UNSPECIFIED HYPERLIPIDEMIA TYPE: ICD-10-CM

## 2022-06-06 DIAGNOSIS — Z12.11 COLON CANCER SCREENING: ICD-10-CM

## 2022-06-06 DIAGNOSIS — F41.9 ANXIETY: ICD-10-CM

## 2022-06-06 DIAGNOSIS — E55.9 VITAMIN D DEFICIENCY: ICD-10-CM

## 2022-06-06 LAB
ESTIMATED AVG GLUCOSE: 134 MG/DL (ref 68–131)
HBA1C MFR BLD: 6.3 % (ref 4–5.6)

## 2022-06-06 PROCEDURE — 83036 HEMOGLOBIN GLYCOSYLATED A1C: CPT | Performed by: NURSE PRACTITIONER

## 2022-06-06 PROCEDURE — 1159F MED LIST DOCD IN RCRD: CPT | Mod: CPTII,S$GLB,, | Performed by: NURSE PRACTITIONER

## 2022-06-06 PROCEDURE — 3074F SYST BP LT 130 MM HG: CPT | Mod: CPTII,S$GLB,, | Performed by: NURSE PRACTITIONER

## 2022-06-06 PROCEDURE — 99396 PREV VISIT EST AGE 40-64: CPT | Mod: S$GLB,,, | Performed by: NURSE PRACTITIONER

## 2022-06-06 PROCEDURE — 3079F DIAST BP 80-89 MM HG: CPT | Mod: CPTII,S$GLB,, | Performed by: NURSE PRACTITIONER

## 2022-06-06 PROCEDURE — 36415 PR COLLECTION VENOUS BLOOD,VENIPUNCTURE: ICD-10-PCS | Mod: S$GLB,,, | Performed by: NURSE PRACTITIONER

## 2022-06-06 PROCEDURE — 36415 COLL VENOUS BLD VENIPUNCTURE: CPT | Mod: S$GLB,,, | Performed by: NURSE PRACTITIONER

## 2022-06-06 PROCEDURE — 3079F PR MOST RECENT DIASTOLIC BLOOD PRESSURE 80-89 MM HG: ICD-10-PCS | Mod: CPTII,S$GLB,, | Performed by: NURSE PRACTITIONER

## 2022-06-06 PROCEDURE — 1160F PR REVIEW ALL MEDS BY PRESCRIBER/CLIN PHARMACIST DOCUMENTED: ICD-10-PCS | Mod: CPTII,S$GLB,, | Performed by: NURSE PRACTITIONER

## 2022-06-06 PROCEDURE — 3074F PR MOST RECENT SYSTOLIC BLOOD PRESSURE < 130 MM HG: ICD-10-PCS | Mod: CPTII,S$GLB,, | Performed by: NURSE PRACTITIONER

## 2022-06-06 PROCEDURE — 3008F BODY MASS INDEX DOCD: CPT | Mod: CPTII,S$GLB,, | Performed by: NURSE PRACTITIONER

## 2022-06-06 PROCEDURE — 3008F PR BODY MASS INDEX (BMI) DOCUMENTED: ICD-10-PCS | Mod: CPTII,S$GLB,, | Performed by: NURSE PRACTITIONER

## 2022-06-06 PROCEDURE — 99396 PR PREVENTIVE VISIT,EST,40-64: ICD-10-PCS | Mod: S$GLB,,, | Performed by: NURSE PRACTITIONER

## 2022-06-06 PROCEDURE — 1160F RVW MEDS BY RX/DR IN RCRD: CPT | Mod: CPTII,S$GLB,, | Performed by: NURSE PRACTITIONER

## 2022-06-06 PROCEDURE — 1159F PR MEDICATION LIST DOCUMENTED IN MEDICAL RECORD: ICD-10-PCS | Mod: CPTII,S$GLB,, | Performed by: NURSE PRACTITIONER

## 2022-06-06 RX ORDER — ERGOCALCIFEROL 1.25 MG/1
CAPSULE ORAL
Qty: 12 CAPSULE | Refills: 2 | Status: SHIPPED | OUTPATIENT
Start: 2022-06-06 | End: 2023-06-11 | Stop reason: SDUPTHER

## 2022-06-06 RX ORDER — PROPRANOLOL HYDROCHLORIDE 160 MG/1
160 CAPSULE, EXTENDED RELEASE ORAL DAILY
Qty: 90 CAPSULE | Refills: 2 | Status: SHIPPED | OUTPATIENT
Start: 2022-06-06 | End: 2022-12-20 | Stop reason: SDUPTHER

## 2022-06-06 RX ORDER — ATORVASTATIN CALCIUM 20 MG/1
20 TABLET, FILM COATED ORAL DAILY
Qty: 90 TABLET | Refills: 2 | Status: SHIPPED | OUTPATIENT
Start: 2022-06-06 | End: 2023-04-28

## 2022-06-06 RX ORDER — AMLODIPINE BESYLATE 10 MG/1
10 TABLET ORAL DAILY
Qty: 90 TABLET | Refills: 2 | Status: SHIPPED | OUTPATIENT
Start: 2022-06-06 | End: 2022-12-20 | Stop reason: SDUPTHER

## 2022-06-06 RX ORDER — METFORMIN HYDROCHLORIDE 500 MG/1
500 TABLET, EXTENDED RELEASE ORAL
Qty: 90 TABLET | Refills: 2 | Status: SHIPPED | OUTPATIENT
Start: 2022-06-06 | End: 2023-06-11 | Stop reason: SDUPTHER

## 2022-06-06 RX ORDER — TRIAMTERENE AND HYDROCHLOROTHIAZIDE 37.5; 25 MG/1; MG/1
1 CAPSULE ORAL DAILY
Qty: 90 CAPSULE | Refills: 2 | Status: SHIPPED | OUTPATIENT
Start: 2022-06-06 | End: 2022-12-20 | Stop reason: SDUPTHER

## 2022-06-06 RX ORDER — ALPRAZOLAM 0.25 MG/1
0.25 TABLET ORAL 2 TIMES DAILY PRN
Qty: 30 TABLET | Refills: 0 | Status: SHIPPED | OUTPATIENT
Start: 2022-06-06 | End: 2022-12-20 | Stop reason: SDUPTHER

## 2022-06-06 NOTE — PROGRESS NOTES
Answers for HPI/ROS submitted by the patient on 6/2/2022  activity change: No  unexpected weight change: No  neck pain: No  hearing loss: No  rhinorrhea: No  trouble swallowing: No  eye discharge: No  visual disturbance: No  chest tightness: No  wheezing: No  chest pain: No  palpitations: No  blood in stool: No  constipation: No  vomiting: No  diarrhea: No  polydipsia: No  polyuria: No  difficulty urinating: No  hematuria: No  menstrual problem: No  dysuria: No  joint swelling: No  arthralgias: No  headaches: No  weakness: No  confusion: No  dysphoric mood: No    Subjective:       Patient ID: Noni Lazo is a 47 y.o. female.    Chief Complaint: Annual Exam    HPI Here for annual exam. States she is doing well. She has some allergy symptoms but is using her nasal spray and taking her allergy medication. She has good BP control on current medication. She is due for foot exam, colon cancer screening and HgbA1c. She has mammogram scheduled later this month. See ROS.    The following portion of the patients history was reviewed and updated as appropriate: allergies, current medications, past medical and surgical history. Past social history and problem list reviewed. Family PMH and Past social history reviewed. Tobacco, Illicit drug use reviewed.      Review of patient's allergies indicates:   Allergen Reactions    Biaxin [clarithromycin]     Epinephrine     Sulfa (sulfonamide antibiotics) Hives         Current Outpatient Medications:     acetaminophen (TYLENOL) 500 MG tablet, Take 2 tablets by mouth Once daily as needed., Disp: , Rfl:     albuterol (VENTOLIN HFA) 90 mcg/actuation inhaler, Inhale 2 puffs into the lungs every 6 (six) hours as needed for Wheezing. RescueINHALE 2 PUFFS INTO THE LUNGS EVERY 6 (SIX) HOURS AS NEEDED FOR WHEEZING. RESCUE, Disp: 18 Inhaler, Rfl: 3    ALPRAZolam (XANAX) 0.25 MG tablet, Take 1 tablet (0.25 mg total) by mouth 2 (two) times daily as needed for Anxiety., Disp: 30  tablet, Rfl: 0    amLODIPine (NORVASC) 10 MG tablet, Take 1 tablet (10 mg total) by mouth once daily., Disp: 90 tablet, Rfl: 0    atorvastatin (LIPITOR) 20 MG tablet, Take 1 tablet (20 mg total) by mouth once daily., Disp: 90 tablet, Rfl: 0    azelastine (ASTELIN) 137 mcg (0.1 %) nasal spray, SPRAY 1 SPRAY (137 MCG TOTAL) BY NASAL ROUTE 2 (TWO) TIMES DAILY., Disp: 30 mL, Rfl: 3    ergocalciferol (VITAMIN D2) 50,000 unit Cap, TAKE ONE CAPSULE BY MOUTH ONE TIME PER WEEK, Disp: 12 capsule, Rfl: 0    famotidine (PEPCID) 40 MG tablet, Take 1 tablet (40 mg total) by mouth once daily., Disp: 30 tablet, Rfl: 5    fluticasone propionate (FLONASE) 50 mcg/actuation nasal spray, 2 sprays (100 mcg total) by Each Nostril route once daily., Disp: 16 g, Rfl: 2    fluticasone-salmeterol diskus inhaler 250-50 mcg, Inhale 1 puff into the lungs 2 (two) times daily. Controller, Disp: 60 each, Rfl: 0    ibuprofen (ADVIL,MOTRIN) 200 MG tablet, Take 200 mg by mouth every 6 (six) hours as needed.  , Disp: , Rfl:     metFORMIN (GLUCOPHAGE-XR) 500 MG ER 24hr tablet, Take 1 tablet (500 mg total) by mouth daily with breakfast., Disp: 90 tablet, Rfl: 3    mupirocin (BACTROBAN) 2 % ointment, Apply topically as needed., Disp: 30 g, Rfl: 1    propranoloL (INDERAL LA) 160 mg 24 hr capsule, Take 1 capsule (160 mg total) by mouth once daily., Disp: 90 capsule, Rfl: 0    triamterene-hydrochlorothiazide 37.5-25 mg (DYAZIDE) 37.5-25 mg per capsule, Take 1 capsule by mouth once daily., Disp: 90 capsule, Rfl: 0    Current Facility-Administered Medications:     dexamethasone injection 4 mg, 4 mg, Intramuscular, Q6H, Batool Blackburn, NP, 4 mg at 06/19/19 1304    Past Medical History:   Diagnosis Date    Anxiety     Diabetes     Heart murmur     Hypertension        Past Surgical History:   Procedure Laterality Date    WISDOM TOOTH EXTRACTION         Social History     Socioeconomic History    Marital status: Single   Tobacco Use     Smoking status: Never Smoker    Smokeless tobacco: Never Used   Substance and Sexual Activity    Alcohol use: No    Drug use: Not Currently    Sexual activity: Not Currently     Social Determinants of Health     Financial Resource Strain: Low Risk     Difficulty of Paying Living Expenses: Not very hard   Food Insecurity: No Food Insecurity    Worried About Running Out of Food in the Last Year: Never true    Ran Out of Food in the Last Year: Never true   Transportation Needs: No Transportation Needs    Lack of Transportation (Medical): No    Lack of Transportation (Non-Medical): No   Physical Activity: Insufficiently Active    Days of Exercise per Week: 1 day    Minutes of Exercise per Session: 10 min   Stress: Stress Concern Present    Feeling of Stress : To some extent   Social Connections: Unknown    Frequency of Communication with Friends and Family: More than three times a week    Frequency of Social Gatherings with Friends and Family: Three times a week    Active Member of Clubs or Organizations: Yes    Attends Club or Organization Meetings: More than 4 times per year    Marital Status: Never    Housing Stability: Low Risk     Unable to Pay for Housing in the Last Year: No    Number of Places Lived in the Last Year: 1    Unstable Housing in the Last Year: No         Review of Systems   Constitutional: Negative for activity change, fatigue and unexpected weight change.   HENT: Positive for postnasal drip and rhinorrhea. Negative for hearing loss, sinus pressure, sinus pain, sore throat and trouble swallowing.    Eyes: Negative for discharge and visual disturbance.   Respiratory: Negative for cough, chest tightness, shortness of breath and wheezing.    Cardiovascular: Negative for chest pain, palpitations and leg swelling.   Gastrointestinal: Negative for abdominal pain, blood in stool, constipation, diarrhea, nausea and vomiting.   Endocrine: Negative for polydipsia and polyuria.  "  Genitourinary: Negative for difficulty urinating, dysuria, hematuria and menstrual problem.   Musculoskeletal: Negative for arthralgias, joint swelling and neck pain.   Neurological: Negative for weakness and headaches.   Hematological: Negative for adenopathy. Does not bruise/bleed easily.   Psychiatric/Behavioral: Negative for confusion, dysphoric mood and sleep disturbance. The patient is not nervous/anxious.        Objective:      /82   Pulse 85   Temp 98.4 °F (36.9 °C) (Temporal)   Resp 16   Ht 5' 3" (1.6 m)   Wt 81.2 kg (179 lb 0.2 oz)   LMP 11/23/2017 (Approximate)   SpO2 99%   BMI 31.71 kg/m²      Physical Exam  Constitutional:       General: She is not in acute distress.     Appearance: Normal appearance. She is well-developed. She is obese.   HENT:      Head: Normocephalic.      Right Ear: Tympanic membrane, ear canal and external ear normal.      Left Ear: Tympanic membrane, ear canal and external ear normal.      Nose: Rhinorrhea present.      Mouth/Throat:      Mouth: Mucous membranes are moist.      Pharynx: Oropharynx is clear.   Eyes:      Conjunctiva/sclera: Conjunctivae normal.      Pupils: Pupils are equal, round, and reactive to light.   Neck:      Thyroid: No thyromegaly.      Vascular: No carotid bruit.      Trachea: Trachea normal. No tracheal tenderness or tracheal deviation.   Cardiovascular:      Rate and Rhythm: Normal rate and regular rhythm.      Pulses: Normal pulses.           Carotid pulses are 2+ on the right side and 2+ on the left side.       Radial pulses are 2+ on the right side and 2+ on the left side.        Dorsalis pedis pulses are 2+ on the right side and 2+ on the left side.        Posterior tibial pulses are 2+ on the right side and 2+ on the left side.      Heart sounds: Normal heart sounds. No murmur heard.    No gallop.   Pulmonary:      Effort: Pulmonary effort is normal. No respiratory distress.      Breath sounds: Normal breath sounds. No stridor. No " wheezing, rhonchi or rales.   Abdominal:      General: Bowel sounds are normal.      Palpations: Abdomen is soft. There is no splenomegaly or mass.      Tenderness: There is no abdominal tenderness. There is no rebound.   Musculoskeletal:         General: Normal range of motion.      Cervical back: Full passive range of motion without pain, normal range of motion and neck supple. No edema.      Right lower leg: No edema.      Left lower leg: No edema.      Right foot: Normal range of motion. No deformity.      Left foot: Normal range of motion. No deformity.      Comments: Gait and coordination normal.  strong, equal. Upper and lower extremity strength normal.    Feet:      Right foot:      Protective Sensation: 8 sites tested. 8 sites sensed.      Skin integrity: No ulcer, blister, skin breakdown or callus.      Toenail Condition: Right toenails are normal.      Left foot:      Protective Sensation: 8 sites tested. 8 sites sensed.      Skin integrity: No ulcer, blister, skin breakdown or callus.      Toenail Condition: Left toenails are normal.   Skin:     General: Skin is warm and dry.      Capillary Refill: Capillary refill takes less than 2 seconds.      Findings: No lesion or rash.   Neurological:      General: No focal deficit present.      Mental Status: She is alert and oriented to person, place, and time.   Psychiatric:         Attention and Perception: Attention and perception normal.         Mood and Affect: Mood and affect normal.         Speech: Speech normal.         Behavior: Behavior normal.         Assessment:       1. Annual physical exam    2. Type 2 diabetes mellitus with hyperglycemia, without long-term current use of insulin    3. Anxiety    4. Hyperlipidemia, unspecified hyperlipidemia type    5. Primary hypertension    6. Vitamin D deficiency    7. Class 1 obesity with body mass index (BMI) of 31.0 to 31.9 in adult, unspecified obesity type, unspecified whether serious comorbidity  present    8. Colon cancer screening        Plan:       Annual physical exam    Type 2 diabetes mellitus with hyperglycemia, without long-term current use of insulin: good control. Last HgbA1c was 6.6  -     Hemoglobin A1C    Anxiety: xanax on prn basis. Does not take often.    Hyperlipidemia, unspecified hyperlipidemia type: tolerating lipitor.    Lab Results   Component Value Date    LDLCALC 92 12/28/2021        Primary hypertension: good BP control. Continue current medications.    Vitamin D deficiency: continue replacement therapy    Class 1 obesity with body mass index (BMI) of 31.0 to 31.9 in adult, unspecified obesity type, unspecified whether serious comorbidity present: Weight loss encouraged. Follow low fat, low carb diet. Portion control, exercise.     Colon cancer screening  -     Fecal Immunochemical Test (iFOBT); Future; Expected date: 06/06/2022    Other orders  -     ALPRAZolam (XANAX) 0.25 MG tablet; Take 1 tablet (0.25 mg total) by mouth 2 (two) times daily as needed for Anxiety.  Dispense: 30 tablet; Refill: 0  -     amLODIPine (NORVASC) 10 MG tablet; Take 1 tablet (10 mg total) by mouth once daily.  Dispense: 90 tablet; Refill: 2  -     atorvastatin (LIPITOR) 20 MG tablet; Take 1 tablet (20 mg total) by mouth once daily.  Dispense: 90 tablet; Refill: 2  -     ergocalciferol (VITAMIN D2) 50,000 unit Cap; TAKE ONE CAPSULE BY MOUTH ONE TIME PER WEEK  Dispense: 12 capsule; Refill: 2  -     metFORMIN (GLUCOPHAGE-XR) 500 MG ER 24hr tablet; Take 1 tablet (500 mg total) by mouth daily with breakfast.  Dispense: 90 tablet; Refill: 2  -     propranoloL (INDERAL LA) 160 mg 24 hr capsule; Take 1 capsule (160 mg total) by mouth once daily.  Dispense: 90 capsule; Refill: 2  -     triamterene-hydrochlorothiazide 37.5-25 mg (DYAZIDE) 37.5-25 mg per capsule; Take 1 capsule by mouth once daily.  Dispense: 90 capsule; Refill: 2       Continue current medication  Take medications only as prescribed  Healthy diet,  exercise  Adequate rest  Adequate hydration  Avoid allergens  Avoid excessive caffeine     follow up 6 months

## 2022-06-07 ENCOUNTER — PATIENT MESSAGE (OUTPATIENT)
Dept: FAMILY MEDICINE | Facility: CLINIC | Age: 47
End: 2022-06-07
Payer: COMMERCIAL

## 2022-06-09 ENCOUNTER — PATIENT MESSAGE (OUTPATIENT)
Dept: FAMILY MEDICINE | Facility: CLINIC | Age: 47
End: 2022-06-09
Payer: COMMERCIAL

## 2022-06-15 ENCOUNTER — PATIENT MESSAGE (OUTPATIENT)
Dept: FAMILY MEDICINE | Facility: CLINIC | Age: 47
End: 2022-06-15
Payer: COMMERCIAL

## 2022-06-15 DIAGNOSIS — E11.65 TYPE 2 DIABETES MELLITUS WITH HYPERGLYCEMIA, WITHOUT LONG-TERM CURRENT USE OF INSULIN: Primary | ICD-10-CM

## 2022-06-16 ENCOUNTER — PATIENT MESSAGE (OUTPATIENT)
Dept: FAMILY MEDICINE | Facility: CLINIC | Age: 47
End: 2022-06-16
Payer: COMMERCIAL

## 2022-06-16 RX ORDER — INSULIN PUMP SYRINGE, 3 ML
EACH MISCELLANEOUS
Qty: 1 EACH | Refills: 0 | Status: SHIPPED | OUTPATIENT
Start: 2022-06-16

## 2022-06-23 ENCOUNTER — PATIENT OUTREACH (OUTPATIENT)
Dept: ADMINISTRATIVE | Facility: HOSPITAL | Age: 47
End: 2022-06-23
Payer: COMMERCIAL

## 2022-06-23 ENCOUNTER — PATIENT MESSAGE (OUTPATIENT)
Dept: ADMINISTRATIVE | Facility: HOSPITAL | Age: 47
End: 2022-06-23
Payer: COMMERCIAL

## 2022-06-23 NOTE — PROGRESS NOTES
Non-compliant [facility] report chart review - Chart review completed for the following HM test if overdue  Colonoscopy,   Care Everywhere and Media reports - updates requested and reviewed.      COLON CANCER SCREENING        HAVE YET TO RETURN KIT that was dispensed in Clinic, Mailed from Clinic/[facility].  Reminder sent. Via letter/Portal

## 2022-06-29 ENCOUNTER — PATIENT MESSAGE (OUTPATIENT)
Dept: FAMILY MEDICINE | Facility: CLINIC | Age: 47
End: 2022-06-29
Payer: COMMERCIAL

## 2022-06-29 RX ORDER — LANCETS
EACH MISCELLANEOUS
Qty: 100 EACH | Refills: 3 | Status: SHIPPED | OUTPATIENT
Start: 2022-06-29 | End: 2023-01-28 | Stop reason: SDUPTHER

## 2022-07-07 ENCOUNTER — PATIENT MESSAGE (OUTPATIENT)
Dept: FAMILY MEDICINE | Facility: CLINIC | Age: 47
End: 2022-07-07
Payer: COMMERCIAL

## 2022-07-07 RX ORDER — FLUTICASONE PROPIONATE 50 MCG
2 SPRAY, SUSPENSION (ML) NASAL DAILY
Qty: 16 G | Refills: 2 | Status: SHIPPED | OUTPATIENT
Start: 2022-07-07 | End: 2023-06-11 | Stop reason: SDUPTHER

## 2022-10-13 ENCOUNTER — PATIENT MESSAGE (OUTPATIENT)
Dept: ADMINISTRATIVE | Facility: HOSPITAL | Age: 47
End: 2022-10-13
Payer: COMMERCIAL

## 2022-11-07 ENCOUNTER — TELEPHONE (OUTPATIENT)
Dept: OPHTHALMOLOGY | Facility: CLINIC | Age: 47
End: 2022-11-07
Payer: COMMERCIAL

## 2022-11-07 NOTE — TELEPHONE ENCOUNTER
----- Message from Janeth Prado sent at 11/7/2022  3:34 PM CST -----  Please call pt for consult for cross linking. TY!

## 2022-12-20 ENCOUNTER — TELEPHONE (OUTPATIENT)
Dept: FAMILY MEDICINE | Facility: CLINIC | Age: 47
End: 2022-12-20

## 2022-12-20 ENCOUNTER — OFFICE VISIT (OUTPATIENT)
Dept: FAMILY MEDICINE | Facility: CLINIC | Age: 47
End: 2022-12-20
Payer: COMMERCIAL

## 2022-12-20 DIAGNOSIS — H18.603 KERATOCONUS OF BOTH EYES: ICD-10-CM

## 2022-12-20 DIAGNOSIS — G56.03 BILATERAL CARPAL TUNNEL SYNDROME: ICD-10-CM

## 2022-12-20 DIAGNOSIS — F41.9 ANXIETY: ICD-10-CM

## 2022-12-20 DIAGNOSIS — E11.65 TYPE 2 DIABETES MELLITUS WITH HYPERGLYCEMIA, WITHOUT LONG-TERM CURRENT USE OF INSULIN: ICD-10-CM

## 2022-12-20 DIAGNOSIS — I10 PRIMARY HYPERTENSION: Primary | ICD-10-CM

## 2022-12-20 DIAGNOSIS — J20.9 BRONCHITIS WITH BRONCHOSPASM: ICD-10-CM

## 2022-12-20 DIAGNOSIS — E78.5 HYPERLIPIDEMIA, UNSPECIFIED HYPERLIPIDEMIA TYPE: ICD-10-CM

## 2022-12-20 PROBLEM — J45.21 MILD INTERMITTENT ASTHMA WITH ACUTE EXACERBATION: Status: ACTIVE | Noted: 2022-12-20

## 2022-12-20 PROCEDURE — 3044F PR MOST RECENT HEMOGLOBIN A1C LEVEL <7.0%: ICD-10-PCS | Mod: CPTII,95,, | Performed by: NURSE PRACTITIONER

## 2022-12-20 PROCEDURE — 99214 OFFICE O/P EST MOD 30 MIN: CPT | Mod: 95,,, | Performed by: NURSE PRACTITIONER

## 2022-12-20 PROCEDURE — 3044F HG A1C LEVEL LT 7.0%: CPT | Mod: CPTII,95,, | Performed by: NURSE PRACTITIONER

## 2022-12-20 PROCEDURE — 1159F MED LIST DOCD IN RCRD: CPT | Mod: CPTII,95,, | Performed by: NURSE PRACTITIONER

## 2022-12-20 PROCEDURE — 1160F PR REVIEW ALL MEDS BY PRESCRIBER/CLIN PHARMACIST DOCUMENTED: ICD-10-PCS | Mod: CPTII,95,, | Performed by: NURSE PRACTITIONER

## 2022-12-20 PROCEDURE — 99214 PR OFFICE/OUTPT VISIT, EST, LEVL IV, 30-39 MIN: ICD-10-PCS | Mod: 95,,, | Performed by: NURSE PRACTITIONER

## 2022-12-20 PROCEDURE — 1159F PR MEDICATION LIST DOCUMENTED IN MEDICAL RECORD: ICD-10-PCS | Mod: CPTII,95,, | Performed by: NURSE PRACTITIONER

## 2022-12-20 PROCEDURE — 1160F RVW MEDS BY RX/DR IN RCRD: CPT | Mod: CPTII,95,, | Performed by: NURSE PRACTITIONER

## 2022-12-20 RX ORDER — FLUTICASONE PROPIONATE AND SALMETEROL 250; 50 UG/1; UG/1
1 POWDER RESPIRATORY (INHALATION) 2 TIMES DAILY
Qty: 60 EACH | Refills: 0 | Status: SHIPPED | OUTPATIENT
Start: 2022-12-20 | End: 2023-06-11 | Stop reason: SDUPTHER

## 2022-12-20 RX ORDER — ALPRAZOLAM 0.25 MG/1
0.25 TABLET ORAL 2 TIMES DAILY PRN
Qty: 30 TABLET | Refills: 0 | Status: SHIPPED | OUTPATIENT
Start: 2022-12-20 | End: 2023-06-11 | Stop reason: SDUPTHER

## 2022-12-20 RX ORDER — AMLODIPINE BESYLATE 10 MG/1
10 TABLET ORAL DAILY
Qty: 90 TABLET | Refills: 2 | Status: SHIPPED | OUTPATIENT
Start: 2022-12-20 | End: 2023-06-11 | Stop reason: SDUPTHER

## 2022-12-20 RX ORDER — TRIAMTERENE AND HYDROCHLOROTHIAZIDE 37.5; 25 MG/1; MG/1
1 CAPSULE ORAL DAILY
Qty: 90 CAPSULE | Refills: 2 | Status: SHIPPED | OUTPATIENT
Start: 2022-12-20 | End: 2023-06-11 | Stop reason: SDUPTHER

## 2022-12-20 RX ORDER — PROPRANOLOL HYDROCHLORIDE 160 MG/1
160 CAPSULE, EXTENDED RELEASE ORAL DAILY
Qty: 90 CAPSULE | Refills: 2 | Status: SHIPPED | OUTPATIENT
Start: 2022-12-20 | End: 2023-06-11 | Stop reason: SDUPTHER

## 2022-12-20 NOTE — PROGRESS NOTES
Answers submitted by the patient for this visit:  Cough Questionnaire (Submitted on 12/20/2022)  Chief Complaint: Cough  Chronicity: recurrent  Onset: 1 to 4 weeks ago  Progression since onset: waxing and waning  Frequency: hourly  Cough characteristics: productive of blood-tinged sputum  chest pain: No  chills: No  ear congestion: Yes  ear pain: No  fever: No  headaches: Yes  heartburn: No  hemoptysis: No  myalgias: No  nasal congestion: Yes  postnasal drip: Yes  rash: No  rhinorrhea: No  shortness of breath: No  sore throat: No  sweats: No  weight loss: No  wheezing: No  Aggravated by: dust, fumes, pollens, stress  asthma: Yes  bronchiectasis: No  bronchitis: Yes  COPD: No  emphysema: No  environmental allergies: Yes  pneumonia: No  Treatments tried: OTC cough suppressant, rest  Improvement on treatment: moderate    The patient location is: [place]  The chief complaint leading to consultation is: follow up    Visit type: audiovisual    Face to Face time with patient: 20  30 minutes of total time spent on the encounter, which includes face to face time and non-face to face time preparing to see the patient (eg, review of tests), Obtaining and/or reviewing separately obtained history, Documenting clinical information in the electronic or other health record, Independently interpreting results (not separately reported) and communicating results to the patient/family/caregiver, or Care coordination (not separately reported).     Each patient to whom he or she provides medical services by telemedicine is:  (1) informed of the relationship between the physician and patient and the respective role of any other health care provider with respect to management of the patient; and (2) notified that he or she may decline to receive medical services by telemedicine and may withdraw from such care at any time.    Subjective:       Patient ID: Noni Lazo is a 47 y.o. female.    Chief Complaint: follow up    Cough  This is  a recurrent problem. The current episode started 1 to 4 weeks ago. The problem has been waxing and waning. The problem occurs hourly. The cough is Productive of blood-tinged sputum. Associated symptoms include ear congestion, nasal congestion and postnasal drip. Pertinent negatives include no chest pain, chills, ear pain, fever, headaches, heartburn, hemoptysis, myalgias, rash, rhinorrhea, sore throat, shortness of breath, sweats, weight loss or wheezing. The symptoms are aggravated by dust, fumes, pollens and stress. She has tried OTC cough suppressant and rest for the symptoms. The treatment provided moderate relief. Her past medical history is significant for asthma, bronchitis and environmental allergies. There is no history of bronchiectasis, COPD, emphysema or pneumonia.     Virtual visit for follow up. States she needs refills on her medications. She is due for labs. Will send orders to [facility].  She still has lingering cough from URI she had about a month ago. Was seen at urgent care. Given steroid injection, antibiotics. States it is just the nagging cough. Requesting refill on her inhaler.     She has Keratoconus and was advised to see Ophthalmology for Cross Linking. That would be Dr. Howard. Referral was placed but states she has not received a call to schedule a visit.     She is scheduled to have EMG to evaluate for carpal tunnel of the right wrist. Followed by Orthopedics.     See ROS.    The following portion of the patients history was reviewed and updated as appropriate: allergies, current medications, past medical and surgical history. Past social history and problem list reviewed. Family PMH and Past social history reviewed. Tobacco, Illicit drug use reviewed.      Review of patient's allergies indicates:   Allergen Reactions    Biaxin [clarithromycin]     Epinephrine     Sulfa (sulfonamide antibiotics) Hives         Current Outpatient Medications:     acetaminophen (TYLENOL) 500 MG tablet,  Take 2 tablets by mouth Once daily as needed., Disp: , Rfl:     albuterol (VENTOLIN HFA) 90 mcg/actuation inhaler, Inhale 2 puffs into the lungs every 6 (six) hours as needed for Wheezing. RescueINHALE 2 PUFFS INTO THE LUNGS EVERY 6 (SIX) HOURS AS NEEDED FOR WHEEZING. RESCUE, Disp: 18 Inhaler, Rfl: 3    ALPRAZolam (XANAX) 0.25 MG tablet, Take 1 tablet (0.25 mg total) by mouth 2 (two) times daily as needed for Anxiety., Disp: 30 tablet, Rfl: 0    amLODIPine (NORVASC) 10 MG tablet, Take 1 tablet (10 mg total) by mouth once daily., Disp: 90 tablet, Rfl: 2    atorvastatin (LIPITOR) 20 MG tablet, Take 1 tablet (20 mg total) by mouth once daily., Disp: 90 tablet, Rfl: 2    azelastine (ASTELIN) 137 mcg (0.1 %) nasal spray, SPRAY 1 SPRAY (137 MCG TOTAL) BY NASAL ROUTE 2 (TWO) TIMES DAILY., Disp: 30 mL, Rfl: 3    blood sugar diagnostic Strp, For daily and prn checks. Contour next 2 strips and lancets, Disp: 45 strip, Rfl: 4    blood-glucose meter (BLOOD GLUCOSE MONITORING) kit, Use as instructed BID.  Contour 2 meter., Disp: 1 each, Rfl: 0    ergocalciferol (VITAMIN D2) 50,000 unit Cap, TAKE ONE CAPSULE BY MOUTH ONE TIME PER WEEK, Disp: 12 capsule, Rfl: 2    famotidine (PEPCID) 40 MG tablet, Take 1 tablet (40 mg total) by mouth once daily., Disp: 30 tablet, Rfl: 5    fluticasone propionate (FLONASE) 50 mcg/actuation nasal spray, 2 sprays (100 mcg total) by Each Nostril route once daily., Disp: 16 g, Rfl: 2    fluticasone-salmeterol diskus inhaler 250-50 mcg, Inhale 1 puff into the lungs 2 (two) times daily. Controller, Disp: 60 each, Rfl: 0    ibuprofen (ADVIL,MOTRIN) 200 MG tablet, Take 200 mg by mouth every 6 (six) hours as needed.  , Disp: , Rfl:     lancets Misc, To check BG one times daily, to use with insurance preferred meter, Disp: 100 each, Rfl: 3    metFORMIN (GLUCOPHAGE-XR) 500 MG ER 24hr tablet, Take 1 tablet (500 mg total) by mouth daily with breakfast., Disp: 90 tablet, Rfl: 2    mupirocin (BACTROBAN) 2 %  ointment, Apply topically as needed., Disp: 30 g, Rfl: 1    propranoloL (INDERAL LA) 160 mg 24 hr capsule, Take 1 capsule (160 mg total) by mouth once daily., Disp: 90 capsule, Rfl: 2    triamterene-hydrochlorothiazide 37.5-25 mg (DYAZIDE) 37.5-25 mg per capsule, Take 1 capsule by mouth once daily., Disp: 90 capsule, Rfl: 2    Current Facility-Administered Medications:     dexamethasone injection 4 mg, 4 mg, Intramuscular, Q6H, Batool Blackburn NP, 4 mg at 06/19/19 1304    Past Medical History:   Diagnosis Date    Anxiety     Diabetes     Heart murmur     Hypertension        Past Surgical History:   Procedure Laterality Date    WISDOM TOOTH EXTRACTION         Social History     Socioeconomic History    Marital status: Single   Tobacco Use    Smoking status: Never    Smokeless tobacco: Never   Substance and Sexual Activity    Alcohol use: No    Drug use: Not Currently    Sexual activity: Not Currently     Social Determinants of Health     Financial Resource Strain: Low Risk     Difficulty of Paying Living Expenses: Not very hard   Food Insecurity: No Food Insecurity    Worried About Running Out of Food in the Last Year: Never true    Ran Out of Food in the Last Year: Never true   Transportation Needs: No Transportation Needs    Lack of Transportation (Medical): No    Lack of Transportation (Non-Medical): No   Physical Activity: Insufficiently Active    Days of Exercise per Week: 2 days    Minutes of Exercise per Session: 10 min   Stress: Stress Concern Present    Feeling of Stress : To some extent   Social Connections: Unknown    Frequency of Communication with Friends and Family: More than three times a week    Frequency of Social Gatherings with Friends and Family: Twice a week    Active Member of Clubs or Organizations: Yes    Attends Club or Organization Meetings: More than 4 times per year    Marital Status: Never    Housing Stability: Low Risk     Unable to Pay for Housing in the Last Year: No     Number of Places Lived in the Last Year: 1    Unstable Housing in the Last Year: No     Review of Systems   Constitutional:  Negative for chills, fatigue, fever and weight loss.   HENT:  Positive for postnasal drip. Negative for ear pain, rhinorrhea and sore throat.    Eyes:  Positive for visual disturbance.   Respiratory:  Positive for cough (lingering. started back on flonase.). Negative for hemoptysis, chest tightness, shortness of breath and wheezing.    Cardiovascular:  Negative for chest pain, palpitations and leg swelling.   Gastrointestinal:  Negative for abdominal pain, diarrhea, heartburn, nausea and vomiting.   Musculoskeletal:  Negative for arthralgias, back pain, gait problem and myalgias.   Skin:  Negative for rash.   Allergic/Immunologic: Positive for environmental allergies.   Neurological:  Negative for headaches.   Psychiatric/Behavioral:  Negative for dysphoric mood and sleep disturbance. The patient is not nervous/anxious.      Objective:      [facility] 11/23/2017 (Approximate)      Physical Exam  Constitutional:       General: She is not in acute distress.     Appearance: She is well-developed. She is not diaphoretic.   HENT:      Head: Normocephalic and atraumatic.      Nose: Nose normal.   Eyes:      Conjunctiva/sclera: Conjunctivae normal.      Right eye: No exudate.     Left eye: No exudate.  Pulmonary:      Effort: No accessory muscle usage or respiratory distress.   Musculoskeletal:      Cervical back: Normal range of motion.      Comments: Coordination appears normal per video   Skin:     General: Skin is dry.      Coloration: Skin is not pale.   Neurological:      Mental Status: She is alert and oriented to person, place, and time.   Psychiatric:         Speech: Speech normal.         Behavior: Behavior normal.         Thought Content: Thought content normal.       Assessment:       1. Primary hypertension    2. Hyperlipidemia, unspecified hyperlipidemia type    3. Type 2 diabetes mellitus  with hyperglycemia, without long-term current use of insulin    4. Bronchitis with bronchospasm    5. Anxiety    6. Keratoconus of both eyes    7. Bilateral carpal tunnel syndrome        Plan:       Primary hypertension: tolerating medications. Good control    Hyperlipidemia, unspecified hyperlipidemia type: due for repeat labs. On statin  -     Comprehensive Metabolic Panel; Future; Expected date: 12/20/2022  -     Lipid Panel; Future; Expected date: 12/20/2022    Type 2 diabetes mellitus with hyperglycemia, without long-term current use of insulin: due for labs.     Lab Results   Component Value Date    LABA1C 6.7 06/19/2018    HGBA1C 6.3 (H) 06/06/2022      -     Hemoglobin A1C; Future; Expected date: 12/20/2022    Bronchitis with bronchospasm: refill advair. Take mucinex daily  -     fluticasone-salmeterol diskus inhaler 250-50 mcg; Inhale 1 puff into the lungs 2 (two) times daily. Controller  Dispense: 60 each; Refill: 0    Anxiety: refill xanax for prn use.  Do not take with ETOH, Sedatives or other narcotics. Do not take and drive due to potential for sedation. Do not take more than the prescribed amount.     Keratoconus of both eyes: will send message to schedule with Dr. Howard.     Carpal tunnel: scheduled for EMG. Follow up with Orthopedics.     Other orders  -     ALPRAZolam (XANAX) 0.25 MG tablet; Take 1 tablet (0.25 mg total) by mouth 2 (two) times daily as needed for Anxiety.  Dispense: 30 tablet; Refill: 0  -     amLODIPine (NORVASC) 10 MG tablet; Take 1 tablet (10 mg total) by mouth once daily.  Dispense: 90 tablet; Refill: 2  -     propranoloL (INDERAL LA) 160 mg 24 hr capsule; Take 1 capsule (160 mg total) by mouth once daily.  Dispense: 90 capsule; Refill: 2  -     triamterene-hydrochlorothiazide 37.5-25 mg (DYAZIDE) 37.5-25 mg per capsule; Take 1 capsule by mouth once daily.  Dispense: 90 capsule; Refill: 2       Continue current medication  Take medications only as prescribed  Healthy diet,  exercise  Adequate rest  Adequate hydration  Avoid allergens  Avoid excessive caffeine       Follow up in office 6 months.

## 2022-12-20 NOTE — TELEPHONE ENCOUNTER
Hi.  This patient saw our nurse practitioner today and a referral was placed for Dr. Randi Howard for Keratoconus of both eyes [H18.823].  I am unable to schedule this patient an appointment with Dr. Howard in [place].  I was told she comes to [place] once per month.  Can you please assist?  Thank you.    She needs cross linking.

## 2022-12-20 NOTE — Clinical Note
She needs an appt with Randi Howard with Ophthalmology. She comes to [place] one a month. Referral placed.

## 2022-12-20 NOTE — TELEPHONE ENCOUNTER
----- Message from Batool Blackburn NP sent at 12/20/2022  2:31 PM CST -----  She needs an appt with Randi Howard with Ophthalmology. She comes to [place] one a month. Referral placed.

## 2022-12-23 ENCOUNTER — PATIENT MESSAGE (OUTPATIENT)
Dept: FAMILY MEDICINE | Facility: CLINIC | Age: 47
End: 2022-12-23
Payer: COMMERCIAL

## 2022-12-23 ENCOUNTER — PATIENT MESSAGE (OUTPATIENT)
Dept: OPHTHALMOLOGY | Facility: CLINIC | Age: 47
End: 2022-12-23
Payer: COMMERCIAL

## 2022-12-27 LAB
CHOLEST SERPL-MSCNC: 210 MG/DL (ref 0–200)
HBA1C MFR BLD: 6.6 % (ref 4–6)
HDLC SERPL-MCNC: 59 MG/DL (ref 35–70)
LDLC SERPL CALC-MCNC: 135 MG/DL (ref 0–160)
TRIGL SERPL-MCNC: 92 MG/DL (ref 40–160)

## 2023-01-17 ENCOUNTER — PATIENT MESSAGE (OUTPATIENT)
Dept: FAMILY MEDICINE | Facility: CLINIC | Age: 48
End: 2023-01-17
Payer: COMMERCIAL

## 2023-01-17 ENCOUNTER — PATIENT MESSAGE (OUTPATIENT)
Dept: ADMINISTRATIVE | Facility: HOSPITAL | Age: 48
End: 2023-01-17
Payer: COMMERCIAL

## 2023-01-18 ENCOUNTER — PATIENT MESSAGE (OUTPATIENT)
Dept: FAMILY MEDICINE | Facility: CLINIC | Age: 48
End: 2023-01-18
Payer: COMMERCIAL

## 2023-01-18 NOTE — TELEPHONE ENCOUNTER
I don't see a copy of her labs on my desk.  Please see if you can get a copy from [facility]

## 2023-01-19 ENCOUNTER — PATIENT OUTREACH (OUTPATIENT)
Dept: ADMINISTRATIVE | Facility: HOSPITAL | Age: 48
End: 2023-01-19
Payer: COMMERCIAL

## 2023-01-20 ENCOUNTER — PATIENT MESSAGE (OUTPATIENT)
Dept: FAMILY MEDICINE | Facility: CLINIC | Age: 48
End: 2023-01-20
Payer: COMMERCIAL

## 2023-01-20 ENCOUNTER — PATIENT MESSAGE (OUTPATIENT)
Dept: OPHTHALMOLOGY | Facility: CLINIC | Age: 48
End: 2023-01-20
Payer: COMMERCIAL

## 2023-02-06 ENCOUNTER — PATIENT MESSAGE (OUTPATIENT)
Dept: FAMILY MEDICINE | Facility: CLINIC | Age: 48
End: 2023-02-06
Payer: COMMERCIAL

## 2023-02-06 DIAGNOSIS — E11.65 TYPE 2 DIABETES MELLITUS WITH HYPERGLYCEMIA, WITHOUT LONG-TERM CURRENT USE OF INSULIN: ICD-10-CM

## 2023-02-07 RX ORDER — LANCETS
EACH MISCELLANEOUS
Qty: 100 EACH | Refills: 3 | Status: SHIPPED | OUTPATIENT
Start: 2023-02-07

## 2023-02-28 ENCOUNTER — PATIENT MESSAGE (OUTPATIENT)
Dept: FAMILY MEDICINE | Facility: CLINIC | Age: 48
End: 2023-02-28
Payer: COMMERCIAL

## 2023-02-28 ENCOUNTER — OFFICE VISIT (OUTPATIENT)
Dept: OPHTHALMOLOGY | Facility: CLINIC | Age: 48
End: 2023-02-28
Payer: COMMERCIAL

## 2023-02-28 DIAGNOSIS — H18.603 KERATOCONUS OF BOTH EYES: ICD-10-CM

## 2023-02-28 PROCEDURE — 99999 PR PBB SHADOW E&M-EST. PATIENT-LVL III: CPT | Mod: PBBFAC,,, | Performed by: OPHTHALMOLOGY

## 2023-02-28 PROCEDURE — 1159F MED LIST DOCD IN RCRD: CPT | Mod: CPTII,S$GLB,, | Performed by: OPHTHALMOLOGY

## 2023-02-28 PROCEDURE — 99204 PR OFFICE/OUTPT VISIT, NEW, LEVL IV, 45-59 MIN: ICD-10-PCS | Mod: S$GLB,,, | Performed by: OPHTHALMOLOGY

## 2023-02-28 PROCEDURE — 99999 PR PBB SHADOW E&M-EST. PATIENT-LVL III: ICD-10-PCS | Mod: PBBFAC,,, | Performed by: OPHTHALMOLOGY

## 2023-02-28 PROCEDURE — 92025 COMPUTERIZED CORNEAL TOPOGRAPHY: ICD-10-PCS | Mod: S$GLB,,, | Performed by: OPHTHALMOLOGY

## 2023-02-28 PROCEDURE — 99204 OFFICE O/P NEW MOD 45 MIN: CPT | Mod: S$GLB,,, | Performed by: OPHTHALMOLOGY

## 2023-02-28 PROCEDURE — 92025 CPTRIZED CORNEAL TOPOGRAPHY: CPT | Mod: S$GLB,,, | Performed by: OPHTHALMOLOGY

## 2023-02-28 PROCEDURE — 1159F PR MEDICATION LIST DOCUMENTED IN MEDICAL RECORD: ICD-10-PCS | Mod: CPTII,S$GLB,, | Performed by: OPHTHALMOLOGY

## 2023-02-28 NOTE — PROGRESS NOTES
HPI    Ref by Dr. Santizo    Ocular History - keratoconus OU - dx'ed around age of 18                            diabetic    Family History - Father might have had keratoconus but pt is unsure    Pt is here today for crosslink eval. Pt states that vision is blurry in   OS. Pt has sensitivity to light/glare and has difficulty with night time   driving ans sees a halo effect. Denies flashes/floaters and eye pain. Pt   does states she has occasional diplopia when tired.     Last edited by Randi Howard MD on 2/28/2023 11:46 AM.            Assessment /Plan     For exam results, see Encounter Report.    Keratoconus of both eyes  -     Ambulatory referral/consult to Ophthalmology  -     Computerized corneal topography          KCN OS>>OD    Unclear if KCN is still progressive at 49 yo - may have little benefit of CXL    Plan for specialty CL Fit OS.    No rubbing eyes    F/up me 1 yr stefan OU

## 2023-04-11 ENCOUNTER — PATIENT MESSAGE (OUTPATIENT)
Dept: ADMINISTRATIVE | Facility: HOSPITAL | Age: 48
End: 2023-04-11
Payer: COMMERCIAL

## 2023-04-24 ENCOUNTER — TELEPHONE (OUTPATIENT)
Dept: OPHTHALMOLOGY | Facility: CLINIC | Age: 48
End: 2023-04-24
Payer: COMMERCIAL

## 2023-04-24 NOTE — TELEPHONE ENCOUNTER
----- Message from Thuan Brewer sent at 4/24/2023  9:42 AM CDT -----  Contact: self  Type: Sooner Appointment Request        Caller is requesting a sooner appointment. Caller declined first available appointment listed below. Caller will not accept being placed on the waitlist and is requesting a message be sent to doctor.        Name of Caller: patient   Best Call Back Number: [phone]  Additional Information: Pt states she just wants to r/s her two appts on 5/2/23 for an actual eye appt and follow up and contact lens fitting plz call to verify. Pt states she wants to get after 5/24/2023. Thanks

## 2023-04-28 RX ORDER — ATORVASTATIN CALCIUM 20 MG/1
TABLET, FILM COATED ORAL
Qty: 90 TABLET | Refills: 2 | Status: SHIPPED | OUTPATIENT
Start: 2023-04-28 | End: 2023-06-11 | Stop reason: SDUPTHER

## 2023-06-07 ENCOUNTER — PATIENT MESSAGE (OUTPATIENT)
Dept: FAMILY MEDICINE | Facility: CLINIC | Age: 48
End: 2023-06-07
Payer: COMMERCIAL

## 2023-06-11 ENCOUNTER — PATIENT MESSAGE (OUTPATIENT)
Dept: FAMILY MEDICINE | Facility: CLINIC | Age: 48
End: 2023-06-11
Payer: COMMERCIAL

## 2023-06-11 DIAGNOSIS — J20.9 BRONCHITIS WITH BRONCHOSPASM: ICD-10-CM

## 2023-06-12 RX ORDER — ATORVASTATIN CALCIUM 20 MG/1
20 TABLET, FILM COATED ORAL DAILY
Qty: 90 TABLET | Refills: 2 | Status: SHIPPED | OUTPATIENT
Start: 2023-06-12

## 2023-06-12 RX ORDER — METFORMIN HYDROCHLORIDE 500 MG/1
500 TABLET, EXTENDED RELEASE ORAL
Qty: 90 TABLET | Refills: 2 | Status: SHIPPED | OUTPATIENT
Start: 2023-06-12 | End: 2024-06-11

## 2023-06-12 RX ORDER — AMLODIPINE BESYLATE 10 MG/1
10 TABLET ORAL DAILY
Qty: 90 TABLET | Refills: 2 | Status: SHIPPED | OUTPATIENT
Start: 2023-06-12

## 2023-06-12 RX ORDER — ALPRAZOLAM 0.25 MG/1
0.25 TABLET ORAL 2 TIMES DAILY PRN
Qty: 30 TABLET | Refills: 0 | Status: SHIPPED | OUTPATIENT
Start: 2023-06-12 | End: 2023-07-12

## 2023-06-12 RX ORDER — FLUTICASONE PROPIONATE AND SALMETEROL 250; 50 UG/1; UG/1
1 POWDER RESPIRATORY (INHALATION) 2 TIMES DAILY
Qty: 60 EACH | Refills: 0 | Status: SHIPPED | OUTPATIENT
Start: 2023-06-12

## 2023-06-12 RX ORDER — TRIAMTERENE AND HYDROCHLOROTHIAZIDE 37.5; 25 MG/1; MG/1
1 CAPSULE ORAL DAILY
Qty: 90 CAPSULE | Refills: 2 | Status: SHIPPED | OUTPATIENT
Start: 2023-06-12

## 2023-06-12 RX ORDER — MUPIROCIN 20 MG/G
OINTMENT TOPICAL
Qty: 30 G | Refills: 1 | Status: SHIPPED | OUTPATIENT
Start: 2023-06-12

## 2023-06-12 RX ORDER — ERGOCALCIFEROL 1.25 MG/1
CAPSULE ORAL
Qty: 12 CAPSULE | Refills: 2 | Status: SHIPPED | OUTPATIENT
Start: 2023-06-12

## 2023-06-12 RX ORDER — PROPRANOLOL HYDROCHLORIDE 160 MG/1
160 CAPSULE, EXTENDED RELEASE ORAL DAILY
Qty: 90 CAPSULE | Refills: 2 | Status: SHIPPED | OUTPATIENT
Start: 2023-06-12 | End: 2024-03-04

## 2023-06-12 NOTE — TELEPHONE ENCOUNTER
She cancelled her appt for today and has not rescheduled.  Do you want to refill meds?  Looks like they were pended to you in another encounter.

## 2023-06-12 NOTE — TELEPHONE ENCOUNTER
Refill Routing Note   Medication(s) are not appropriate for processing by [facility] for the following reason(s):      Non-participating provider    [facility] action(s):  Route Care Due:  None identified      Refill center unable to handle this medication because the PCP is not covered under our protocol.      Appointments  past 12m or future 3m with PCP    Date Provider   Last Visit   12/20/2022 Batool Blackburn NP   Next Visit   Visit date not found Batool Blackburn NP   ED visits in past 90 days: 0        Note composed:5:47 PM 06/12/2023

## 2023-06-13 RX ORDER — FLUTICASONE PROPIONATE 50 MCG
2 SPRAY, SUSPENSION (ML) NASAL DAILY
Qty: 16 G | Refills: 2 | Status: SHIPPED | OUTPATIENT
Start: 2023-06-13

## 2023-06-22 ENCOUNTER — PATIENT MESSAGE (OUTPATIENT)
Dept: ADMINISTRATIVE | Facility: HOSPITAL | Age: 48
End: 2023-06-22
Payer: COMMERCIAL

## 2023-06-22 ENCOUNTER — PATIENT OUTREACH (OUTPATIENT)
Dept: ADMINISTRATIVE | Facility: HOSPITAL | Age: 48
End: 2023-06-22
Payer: COMMERCIAL

## 2023-06-22 DIAGNOSIS — E11.9 DIABETES MELLITUS WITHOUT COMPLICATION: Primary | ICD-10-CM

## 2023-06-22 DIAGNOSIS — Z12.31 OTHER SCREENING MAMMOGRAM: ICD-10-CM

## 2023-06-22 DIAGNOSIS — J30.2 SEASONAL ALLERGIC RHINITIS, UNSPECIFIED TRIGGER: ICD-10-CM

## 2023-06-22 NOTE — PROGRESS NOTES
BREAST CANCER SCREENING    Non-compliant report chart audits for BREAST CANCER SCREENING     Outreach to patient in reference to SCHEDULING A MAMMOGRAM EXAM.     WEEKLY BULK ORDER REPORT.  ORDER PLACED    Population Health Chart Review & Patient Outreach Details:     Reason for Outreach Encounter:     [x]  Non-Compliant Report   []  Payor Report ([employer], PHN, BCBS, MSSP, MCIP, C, etc.)   []  Pre-Visit Chart Review     Updates Requested / Reviewed:     []  Care Everywhere    []     []  External Sources ([facility], [facility], DIS, etc.)   []  Care Team Updated    Patient Outreach Method:    []  Telephone Outreach Completed   [] Successful   [] Left Voicemail   [] Unable to Contact (wrong number, no voicemail)  [x]  [facility] Outreach Sent  []  Letter Outreach Mailed  []  Fax Sent for External Records  []  External Records Upload    Health Maintenance Topics Addressed and Outreach Outcomes / Actions Taken:        [x]      Breast Cancer Screening []  Mammo Scheduled      []  External Records Requested     []  Added Reminder to Complete to Upcoming Primary Care Appt Notes     []  Patient Declined     []  Patient Will Call Back to Schedule     []  Patient Will Schedule with External Provider / Order Routed if Applicable             []       Cervical Cancer Screening []  Pap Scheduled      []  External Records Requested     []  Added Reminder to Complete to Upcoming Primary Care Appt Notes     []  Patient Declined     []  Patient Will Call Back to Schedule     []  Patient Will Schedule with External Provider               []          Colorectal Cancer Screening []  Colonoscopy Case Request or Referral Placed     []  External Records Requested     []  Added Reminder to Complete to Upcoming Primary Care Appt Notes     []  Patient Declined     []  Patient Will Call Back to Schedule     []  Patient Will Schedule with External Provider     []  Fit Kit Mailed (add the SmartPhrase under additional notes)     []   Reminded Patient to Complete Home Test             []      Diabetic Eye Exam []  Eye Camera Scheduled or Optometry Referral Placed     []  External Records Requested     []  Added Reminder to Complete to Upcoming Primary Care Appt Notes     []  Patient Declined     []  Patient Will Call Back to Schedule     []  Patient Will Schedule with External Provider             []      Blood Pressure Control []  Primary Care Follow Up Visit Scheduled     []  Remote Blood Pressure Reading Captured     []  Added Reminder to Complete to Upcoming Primary Care Appt Notes     []  Patient Declined     []  Patient Will Call Back / Patient Will Send Portal Message with Reading     []  Patient Will Call Back to Schedule Provider Visit             [x]       HbA1c & Other Labs []  Lab Appt Scheduled for Due Labs     []  Primary Care Follow Up Visit Scheduled      []  Reminded Patient to Complete Home Test     []  Added Reminder to Complete to Upcoming Primary Care Appt Notes     []  Patient Declined     []  Patient Will Call Back to Schedule     []  Patient Will Schedule with External Provider / Order Routed if Applicable           []    Schedule Primary Care Appt []  Primary Care Appt Scheduled     []  Patient Declined     []  Patient Will Call Back to Schedule     []  Pt Established with External Provider & Updated Care Team             []      Medication Adherence []  Primary Care Appointment Scheduled     []  Added Reminder to Upcoming Primary Care Appt Notes     []  Patient Reminded to  Prescription     []  Patient Declined, Provider Notified if Needed     []  Sent Provider Message to Review and/or Add Exclusion to Problem List             []      Osteoporosis Screening []  DXA Appointment Scheduled     []  External Records Requested     []  Added Reminder to Complete to Upcoming Primary Care Appt Notes     []  Patient Declined     []  Patient Will Call Back to Schedule     []  Patient Will Schedule with External Provider /  Order Routed if Applicable     Additional Care Coordinator Notes:         Further Action Needed If Patient Returns Outreach:

## 2023-06-25 RX ORDER — AZELASTINE 1 MG/ML
1 SPRAY, METERED NASAL 2 TIMES DAILY
Qty: 30 ML | Refills: 3 | Status: SHIPPED | OUTPATIENT
Start: 2023-06-25

## 2023-06-30 ENCOUNTER — PATIENT MESSAGE (OUTPATIENT)
Dept: ADMINISTRATIVE | Facility: HOSPITAL | Age: 48
End: 2023-06-30
Payer: COMMERCIAL

## 2023-06-30 ENCOUNTER — PATIENT OUTREACH (OUTPATIENT)
Dept: ADMINISTRATIVE | Facility: HOSPITAL | Age: 48
End: 2023-06-30
Payer: COMMERCIAL

## 2023-06-30 NOTE — PROGRESS NOTES
Uncontrolled BP REPORT  BP Readings from Last 3 Encounters:   06/06/22 118/82   08/10/20 110/82   06/19/19 122/60       Non-compliant report chart audits for HYPERTENSION MANAGEMENT     Outreach to patient in reference to hypertension management       NEED REMOTE HOME BP READING DOCUMENTED   OR  BP FOLLOW UP WITH NURSE VISIT OR CARE TEAM MEMBER

## 2023-07-03 VITALS — DIASTOLIC BLOOD PRESSURE: 78 MMHG | SYSTOLIC BLOOD PRESSURE: 130 MMHG

## 2023-07-05 ENCOUNTER — TELEPHONE (OUTPATIENT)
Dept: OPTOMETRY | Facility: CLINIC | Age: 48
End: 2023-07-05
Payer: COMMERCIAL

## 2023-07-05 ENCOUNTER — OFFICE VISIT (OUTPATIENT)
Dept: OPTOMETRY | Facility: CLINIC | Age: 48
End: 2023-07-05
Payer: COMMERCIAL

## 2023-07-05 DIAGNOSIS — Z46.0 CONTACT LENS/GLASSES FITTING: Primary | ICD-10-CM

## 2023-07-05 DIAGNOSIS — H52.7 REFRACTIVE ERROR: ICD-10-CM

## 2023-07-05 DIAGNOSIS — H18.603 KERATOCONUS OF BOTH EYES: ICD-10-CM

## 2023-07-05 DIAGNOSIS — Z01.00 EXAMINATION OF EYES AND VISION: Primary | ICD-10-CM

## 2023-07-05 PROCEDURE — 92310 PR CONTACT LENS FITTING (NO CHANGE): ICD-10-PCS | Mod: ,,, | Performed by: OPTOMETRIST

## 2023-07-05 PROCEDURE — 92004 COMPRE OPH EXAM NEW PT 1/>: CPT | Mod: S$GLB,,, | Performed by: OPTOMETRIST

## 2023-07-05 PROCEDURE — 92004 PR EYE EXAM, NEW PATIENT,COMPREHESV: ICD-10-PCS | Mod: S$GLB,,, | Performed by: OPTOMETRIST

## 2023-07-05 PROCEDURE — 99999 PR PBB SHADOW E&M-EST. PATIENT-LVL II: CPT | Mod: PBBFAC,,, | Performed by: OPTOMETRIST

## 2023-07-05 PROCEDURE — 99999 PR PBB SHADOW E&M-EST. PATIENT-LVL II: ICD-10-PCS | Mod: PBBFAC,,, | Performed by: OPTOMETRIST

## 2023-07-05 PROCEDURE — 99499 NO LOS: ICD-10-PCS | Mod: S$GLB,,, | Performed by: OPTOMETRIST

## 2023-07-05 PROCEDURE — 92310 CONTACT LENS FITTING OU: CPT | Mod: ,,, | Performed by: OPTOMETRIST

## 2023-07-05 PROCEDURE — 99499 UNLISTED E&M SERVICE: CPT | Mod: S$GLB,,, | Performed by: OPTOMETRIST

## 2023-07-05 NOTE — TELEPHONE ENCOUNTER
Spoke to pt in regards to specialty cls fit today with price of scleral cls.   Pt states was not informed of cost of $500 per lens prior to today, only the $50 for contact exam fitting fee. Pt would still like to keep appt today to discuss options for contacts.  Explained that she may want to check with insurance to see if can get reimbursed for some of the costs due to keratoconus.

## 2023-07-05 NOTE — PROGRESS NOTES
"HPI    Pt referred by Dr. Howard for possible scleral clfit - OS.    States   blurred vision OS due to keratoconus.   Feels like OD vision is good but   feels strained.    Has worn rgps in past but d/c wear about 10-15 yrs ago due to physical   discomfort.    Pt does wear specs now but did not bring.    Hemoglobin A1C       Date                     Value               Ref Range             Status                12/27/2022               6.6 (H)             4.8 - 5.6 %           Final              06/06/2022               6.3 (H)             4.0 - 5.6 %           Final                 08/11/2020               6.5 (H)             0.0 - 5.6 %           Final                 12/28/2021               6.6 (H)             4.8 - 5.6 %           Final            BSL controlled    (+) allergies -- "always rubbing OU"  (-) gtts  (+) floaters OU occasionally --- no light flashes        Last edited by Ivette Doan on 7/5/2023  2:09 PM.            Assessment /Plan     For exam results, see Encounter Report.    Examination of eyes and vision    Keratoconus of both eyes    Refractive error      1. Examination of eyes and vision    2. Keratoconus of both eyes  Updated stefan today -- reviewed with patient  KCN OS >> OD  CS elite 16.8 alberta oblate / 7.67 BC / -5.00 DS / optimum extra / blue ([facility] # [phone])    3. Refractive error  Discussed options  Ordered scleral lens: CS elite 16.8 alberta oblate / 7.67 BC / -5.00 DS / optimum extra / blue ([facility] # [phone])  Return for initial cls dispense and ORx                   "

## 2023-07-05 NOTE — PROGRESS NOTES
Assessment /Plan     For exam results, see Encounter Report.    Contact lens/glasses fitting      Patient is here for a comprehensive eye exam and contact lens fit. See other exam visit with same encounter date 07/05/2023 for detailed exam information.

## 2023-07-18 ENCOUNTER — OFFICE VISIT (OUTPATIENT)
Dept: OPTOMETRY | Facility: CLINIC | Age: 48
End: 2023-07-18
Payer: COMMERCIAL

## 2023-07-18 ENCOUNTER — PATIENT OUTREACH (OUTPATIENT)
Dept: ADMINISTRATIVE | Facility: HOSPITAL | Age: 48
End: 2023-07-18
Payer: COMMERCIAL

## 2023-07-18 DIAGNOSIS — Z97.3 WEARS CONTACT LENSES: Primary | ICD-10-CM

## 2023-07-18 PROCEDURE — 99499 NO LOS: ICD-10-PCS | Mod: S$GLB,,, | Performed by: OPTOMETRIST

## 2023-07-18 PROCEDURE — 99499 UNLISTED E&M SERVICE: CPT | Mod: S$GLB,,, | Performed by: OPTOMETRIST

## 2023-07-18 NOTE — PROGRESS NOTES
HPI    Pt here today for clfu -- inserted scleral lens OS.    Pt states good fit   & comfort and VERY HAPPY.  Last edited by Ivette Doan on 7/18/2023  1:30 PM.            Assessment /Plan     For exam results, see Encounter Report.    Wears contact lenses      Good scleral fit #1:  Good comfort and vision  Good centration  ~250-300 micron clearance  Neg blanching  Discussed proper wear/care  Pt successfully completed I&R in office without complications  Return for f/u in 1 month, sooner prn

## 2023-08-13 ENCOUNTER — PATIENT MESSAGE (OUTPATIENT)
Dept: FAMILY MEDICINE | Facility: CLINIC | Age: 48
End: 2023-08-13
Payer: COMMERCIAL

## 2023-08-15 ENCOUNTER — PATIENT MESSAGE (OUTPATIENT)
Dept: FAMILY MEDICINE | Facility: CLINIC | Age: 48
End: 2023-08-15
Payer: COMMERCIAL

## 2023-08-18 ENCOUNTER — OFFICE VISIT (OUTPATIENT)
Dept: OPTOMETRY | Facility: CLINIC | Age: 48
End: 2023-08-18
Payer: COMMERCIAL

## 2023-08-18 DIAGNOSIS — Z97.3 WEARS CONTACT LENSES: Primary | ICD-10-CM

## 2023-08-18 DIAGNOSIS — H18.603 KERATOCONUS OF BOTH EYES: ICD-10-CM

## 2023-08-18 DIAGNOSIS — H52.7 REFRACTIVE ERROR: ICD-10-CM

## 2023-08-18 PROCEDURE — 99499 UNLISTED E&M SERVICE: CPT | Mod: S$GLB,,, | Performed by: OPTOMETRIST

## 2023-08-18 PROCEDURE — 99499 NO LOS: ICD-10-PCS | Mod: S$GLB,,, | Performed by: OPTOMETRIST

## 2023-08-18 NOTE — PROGRESS NOTES
"HPI    Pt here today for cls follow up.    Pt wearing scleral cls OS only.      States doing well with contact, is able to get in & out easily.       Good fit & comfort.    "Vision is perfect OU when wearing glasses with cls".    Last edited by Ivette Doan on 8/18/2023  3:32 PM.            Assessment /Plan     For exam results, see Encounter Report.    Wears contact lenses    Refractive error    Keratoconus of both eyes      Great scleral fit OS   Neg conj blanching  ~200 microns vaulting  VA 20/20    Discussed options OD  Prefers to do glasses vs soft contact lens OD  Dispensed updated spectacle Rx. Discussed various spectacle lens options. Discussed adaptation period to new specs.   Demonstrated new spec Rx vs uncorrected vision in phoropter with patient satisfaction      Return in 6 months for scleral f/u OS and stefan, consider cross-linking OD prn                 "

## 2023-08-21 ENCOUNTER — PATIENT MESSAGE (OUTPATIENT)
Dept: FAMILY MEDICINE | Facility: CLINIC | Age: 48
End: 2023-08-21
Payer: COMMERCIAL

## 2023-08-21 ENCOUNTER — PATIENT OUTREACH (OUTPATIENT)
Dept: ADMINISTRATIVE | Facility: HOSPITAL | Age: 48
End: 2023-08-21
Payer: COMMERCIAL

## 2023-08-21 DIAGNOSIS — Z12.11 SPECIAL SCREENING FOR MALIGNANT NEOPLASMS, COLON: Primary | ICD-10-CM

## 2023-11-13 ENCOUNTER — PATIENT MESSAGE (OUTPATIENT)
Dept: FAMILY MEDICINE | Facility: CLINIC | Age: 48
End: 2023-11-13
Payer: COMMERCIAL

## 2024-03-04 RX ORDER — PROPRANOLOL HYDROCHLORIDE 160 MG/1
160 CAPSULE, EXTENDED RELEASE ORAL
Qty: 30 CAPSULE | Refills: 0 | Status: SHIPPED | OUTPATIENT
Start: 2024-03-04

## 2024-03-04 NOTE — TELEPHONE ENCOUNTER
She has not seen me since December of 2022.  I will give one 30 day supply of her medication to hold her until she comes in for a visit and lab work.

## 2024-03-04 NOTE — TELEPHONE ENCOUNTER
Spoke to pt and notified her that Batool said she did give her a 30 day supply of her med, but she needs an office visit for further refills.  Pt verbalizes understanding.

## 2024-04-11 DIAGNOSIS — E11.9 TYPE 2 DIABETES MELLITUS WITHOUT COMPLICATION: ICD-10-CM

## 2024-05-30 ENCOUNTER — OFFICE VISIT (OUTPATIENT)
Dept: OPTOMETRY | Facility: CLINIC | Age: 49
End: 2024-05-30
Payer: COMMERCIAL

## 2024-05-30 DIAGNOSIS — H18.603 KERATOCONUS OF BOTH EYES: Primary | ICD-10-CM

## 2024-05-30 PROCEDURE — 3044F HG A1C LEVEL LT 7.0%: CPT | Mod: CPTII,S$GLB,, | Performed by: OPTOMETRIST

## 2024-05-30 PROCEDURE — 99999 PR PBB SHADOW E&M-EST. PATIENT-LVL III: CPT | Mod: PBBFAC,,, | Performed by: OPTOMETRIST

## 2024-05-30 PROCEDURE — 92025 CPTRIZED CORNEAL TOPOGRAPHY: CPT | Mod: S$GLB,,, | Performed by: OPTOMETRIST

## 2024-05-30 PROCEDURE — 1159F MED LIST DOCD IN RCRD: CPT | Mod: CPTII,S$GLB,, | Performed by: OPTOMETRIST

## 2024-05-30 PROCEDURE — 1160F RVW MEDS BY RX/DR IN RCRD: CPT | Mod: CPTII,S$GLB,, | Performed by: OPTOMETRIST

## 2024-05-30 PROCEDURE — 99213 OFFICE O/P EST LOW 20 MIN: CPT | Mod: S$GLB,,, | Performed by: OPTOMETRIST

## 2024-05-30 RX ORDER — VALSARTAN AND HYDROCHLOROTHIAZIDE 320; 25 MG/1; MG/1
1 TABLET, FILM COATED ORAL EVERY MORNING
COMMUNITY
Start: 2024-05-16

## 2024-05-30 NOTE — PROGRESS NOTES
HPI    48 YO female presents today for a ctl fu OS. Patient notes she is doing   well, notes no problems or complaints. Sometimes wears glasses over lens   to help with OD.   Last edited by Christy Byrne on 5/30/2024  9:18 AM.            Assessment /Plan     For exam results, see Encounter Report.    Keratoconus of both eyes      Stable -- updated stefan today, reviewed with pt    Doing well with scleral OS  VA 20/25  Central, ~250 vaulting, neg conj blanching   Neg corneal caroline   Recheck yearly    Discussed options OD  Recommend trying Biofinity Toric XR OD for intermediate monovision  Prefers to go to Dr. Santizo for fit, can return here prn

## 2025-01-29 ENCOUNTER — PATIENT MESSAGE (OUTPATIENT)
Dept: OPTOMETRY | Facility: CLINIC | Age: 50
End: 2025-01-29
Payer: COMMERCIAL

## 2025-01-29 ENCOUNTER — TELEPHONE (OUTPATIENT)
Dept: OPTOMETRY | Facility: CLINIC | Age: 50
End: 2025-01-29
Payer: COMMERCIAL

## 2025-01-30 ENCOUNTER — OFFICE VISIT (OUTPATIENT)
Dept: OPTOMETRY | Facility: CLINIC | Age: 50
End: 2025-01-30
Payer: COMMERCIAL

## 2025-01-30 DIAGNOSIS — H18.20 MARGINAL CORNEAL INFILTRATE: ICD-10-CM

## 2025-01-30 DIAGNOSIS — H04.122 DRY EYE SYNDROME OF LEFT EYE: ICD-10-CM

## 2025-01-30 DIAGNOSIS — H10.89 OTHER CONJUNCTIVITIS OF LEFT EYE: Primary | ICD-10-CM

## 2025-01-30 DIAGNOSIS — Z97.3 WEARS CONTACT LENSES: ICD-10-CM

## 2025-01-30 PROCEDURE — 99214 OFFICE O/P EST MOD 30 MIN: CPT | Mod: S$GLB,,, | Performed by: OPTOMETRIST

## 2025-01-30 PROCEDURE — 99999 PR PBB SHADOW E&M-EST. PATIENT-LVL III: CPT | Mod: PBBFAC,,, | Performed by: OPTOMETRIST

## 2025-01-30 PROCEDURE — 1160F RVW MEDS BY RX/DR IN RCRD: CPT | Mod: CPTII,S$GLB,, | Performed by: OPTOMETRIST

## 2025-01-30 PROCEDURE — 1159F MED LIST DOCD IN RCRD: CPT | Mod: CPTII,S$GLB,, | Performed by: OPTOMETRIST

## 2025-01-30 RX ORDER — NEOMYCIN SULFATE, POLYMYXIN B SULFATE AND DEXAMETHASONE 3.5; 10000; 1 MG/ML; [USP'U]/ML; MG/ML
1 SUSPENSION/ DROPS OPHTHALMIC 4 TIMES DAILY
Qty: 5 ML | Refills: 0 | Status: SHIPPED | OUTPATIENT
Start: 2025-01-30

## 2025-01-30 NOTE — PROGRESS NOTES
HPI    Pt states Monday evening OS felt itchy and irritated. Turned red after   rubbing it and felt heavy  Went to sleep and when she woke up it was very red. Turned watery and   filmy. The next morning had crust and mucus. Went to urgent care yesterday   and was given Tobramycin to take every 4hrs  Says it does look slightly improved today.  Wears scleral lenses and concerned on how to keep lens clean or if she   should replace it.   Has been feeling congestion and what feels like a sinus infection   starting.    Last edited by Sabrina Perez on 1/30/2025  7:08 AM.            Assessment /Plan     For exam results, see Encounter Report.    Other conjunctivitis of left eye  -     neomycin-polymyxin-dexamethasone (MAXITROL) 3.5mg/mL-10,000 unit/mL-0.1 % DrpS; Place 1 drop into the left eye 4 (four) times daily.  Dispense: 5 mL; Refill: 0    Marginal corneal infiltrate    Dry eye syndrome of left eye    Wears contact lenses      1. Other conjunctivitis of left eye (Primary)  2. Marginal corneal infiltrate  Circumferential limbal injection 360  Noted after re-wearing scleral OS after 1 week of no wear  Discussed possible etiologies  D/C cls wear until resolved  D/C tobramycin    Start Maxitrol: 1 gt qid OS -- shake well before use  F/u in 1-2 weeks, sooner if any worsening     - neomycin-polymyxin-dexamethasone (MAXITROL) 3.5mg/mL-10,000 unit/mL-0.1 % DrpS; Place 1 drop into the left eye 4 (four) times daily.  Dispense: 5 mL; Refill:     3. Dry eye syndrome of left eye  Start otc Refresh Celluvisc qid OS -- wait 5-10 minutes after Maxitrol use  F/u in 1-2 weeks, sooner if any worsening     4. Wears contact lenses  D/C scleral OS until resolved  Pt notes lens left in unique pH solution x 1 week  Clean lens in ClearCare solution as directed, then leave scleral dry for storage  F/u in 1-2 weeks, will re-fit prn

## 2025-02-12 ENCOUNTER — OFFICE VISIT (OUTPATIENT)
Dept: OPTOMETRY | Facility: CLINIC | Age: 50
End: 2025-02-12
Payer: COMMERCIAL

## 2025-02-12 DIAGNOSIS — H16.142 SPK (SUPERFICIAL PUNCTATE KERATITIS), LEFT: Primary | ICD-10-CM

## 2025-02-12 DIAGNOSIS — Z97.3 WEARS CONTACT LENSES: ICD-10-CM

## 2025-02-12 PROCEDURE — 1159F MED LIST DOCD IN RCRD: CPT | Mod: CPTII,S$GLB,, | Performed by: OPTOMETRIST

## 2025-02-12 PROCEDURE — 99999 PR PBB SHADOW E&M-EST. PATIENT-LVL III: CPT | Mod: PBBFAC,,, | Performed by: OPTOMETRIST

## 2025-02-12 PROCEDURE — 1160F RVW MEDS BY RX/DR IN RCRD: CPT | Mod: CPTII,S$GLB,, | Performed by: OPTOMETRIST

## 2025-02-12 PROCEDURE — 99213 OFFICE O/P EST LOW 20 MIN: CPT | Mod: S$GLB,,, | Performed by: OPTOMETRIST

## 2025-02-12 NOTE — PROGRESS NOTES
HPI     Conjunctivitis     Additional comments: DLE 1-2025           Comments    Pt here today for 2 week f/u.   States OS doing much better.    No longer   having any irritation, burning or redness.  Still has some itching but thinks due more to allergies.    Has not resumed cls wear.    Maxitrol gtts OS qid  Refresh Celluvisc OS qid            Last edited by Ivette Doan on 2/12/2025  3:14 PM.            Assessment /Plan     For exam results, see Encounter Report.    There are no diagnoses linked to this encounter.  1. SPK (superficial punctate keratitis), left (Primary)  Resolved infiltrate  Trace SPK remaining  Start PF taper: 1 gt twice daily x 1 week, then 1 gt once daily x 1 week, then d/c   Report back if any worsening     2. Wears contact lenses  Current scleral OS - good fit  ~250 vaulting  Neg conj blanching   Return in 3 months for comprehensive / interested in scleral fitting for OD

## 2025-03-13 ENCOUNTER — PATIENT MESSAGE (OUTPATIENT)
Dept: OPTOMETRY | Facility: CLINIC | Age: 50
End: 2025-03-13
Payer: COMMERCIAL

## 2025-06-13 ENCOUNTER — TELEPHONE (OUTPATIENT)
Dept: OPTOMETRY | Facility: CLINIC | Age: 50
End: 2025-06-13
Payer: COMMERCIAL

## 2025-06-13 NOTE — TELEPHONE ENCOUNTER
Returned pt call, offered to move appmt to next available. Pt opted to keep current appmt time.       Copied from CRM #[id number]. Topic: Appointments - Appointment Rescheduling  >> Jun 13, 2025  4:12 PM Erica wrote:  Type:  Sooner Apoointment Request    Caller is requesting a sooner appointment.  Caller declined first available appointment listed below.  Caller will not accept being placed on the waitlist and is requesting a message be sent to doctor.  Name of Caller: pt  When is the first available appointment? 06/17 @ 2:15   Symptoms:  Would the patient rather a call back or a response via [facility]? call  Best Call Back Number:Telephone Information:  Mobile          [phone]      Additional Information:  pt would like to come in earlier around 11 even if she can do a diffrenet day that week earlier in the day please call pt to advise and sridhar

## 2025-06-17 ENCOUNTER — OFFICE VISIT (OUTPATIENT)
Dept: OPTOMETRY | Facility: CLINIC | Age: 50
End: 2025-06-17
Payer: COMMERCIAL

## 2025-06-17 DIAGNOSIS — Z01.00 EXAMINATION OF EYES AND VISION: Primary | ICD-10-CM

## 2025-06-17 DIAGNOSIS — H52.7 REFRACTIVE ERROR: ICD-10-CM

## 2025-06-17 DIAGNOSIS — E11.9 DIABETES MELLITUS TYPE 2 WITHOUT RETINOPATHY: ICD-10-CM

## 2025-06-17 DIAGNOSIS — H26.9 CORTICAL CATARACT: ICD-10-CM

## 2025-06-17 DIAGNOSIS — H18.603 KERATOCONUS OF BOTH EYES: ICD-10-CM

## 2025-06-17 DIAGNOSIS — Z46.0 CONTACT LENS/GLASSES FITTING: Primary | ICD-10-CM

## 2025-06-17 DIAGNOSIS — H04.123 DRY EYE SYNDROME OF BOTH EYES: ICD-10-CM

## 2025-06-17 PROCEDURE — 92310 CONTACT LENS FITTING OU: CPT | Mod: CSM,,, | Performed by: OPTOMETRIST

## 2025-06-17 PROCEDURE — 99499 UNLISTED E&M SERVICE: CPT | Mod: ,,, | Performed by: OPTOMETRIST

## 2025-06-17 PROCEDURE — 99999 PR PBB SHADOW E&M-EST. PATIENT-LVL II: CPT | Mod: PBBFAC,,, | Performed by: OPTOMETRIST

## 2025-06-17 PROCEDURE — 92015 DETERMINE REFRACTIVE STATE: CPT | Mod: S$GLB,,, | Performed by: OPTOMETRIST

## 2025-06-17 PROCEDURE — 92014 COMPRE OPH EXAM EST PT 1/>: CPT | Mod: S$GLB,,, | Performed by: OPTOMETRIST

## 2025-06-17 PROCEDURE — 99999 PR PBB SHADOW E&M-EST. PATIENT-LVL III: CPT | Mod: PBBFAC,,, | Performed by: OPTOMETRIST

## 2025-06-17 NOTE — PROGRESS NOTES
HPI     Annual Exam     Additional comments: DLE 2-2025           Comments    Pt states no changes in vision noticed  Allergies has been bad, not wearing lens as much lately  Has been using Refresh PF daily ~3x daily, has been helping a lot  Just wanting to do glasses for OD, scleral for OS    (-)headaches  (-)FOL/floaters     Hemoglobin A1C       Date                     Value               Ref Range             Status                05/15/2024               6.7 (H)             4.8 - 5.6 %           Final                 12/27/2022               6.6 (H)             4.8 - 5.6 %           Final                 12/27/2022               6.6 (A)             4.0 - 6.0 %           Final                 06/06/2022               6.3 (H)             4.0 - 5.6 %           Final                 08/11/2020               6.5 (H)             0.0 - 5.6 %           Final                           Last edited by Lenny Bowden, OD on 6/17/2025  3:01 PM.            Assessment /Plan     For exam results, see Encounter Report.    Examination of eyes and vision    Cortical cataract    Refractive error    Keratoconus of both eyes    Dry eye syndrome of left eye      1. Examination of eyes and vision (Primary)    2. Cortical cataract  Mild OU, not VS   Discussed possible ocular affects of cataracts. Acceptable BCVA OU.   Discussed treatment options. Surgery not recommended at this time.   Monitor yearly.     3. Refractive error  Dispensed updated spec rx  Likes glasses for OD, plano OS to wear over scleral cls   Demonstrated new spec Rx vs current specs in phoropter with patient satisfaction    4. Keratoconus of both eyes  Stable   Updated pentacam scan today  Doing well with current scleral lens OS  Good fit, vaulting, neg conj blanching  [facility]  Updated rx in chart to order new lens prn    5. Dry eye syndrome of both eyes  Discussed ocular affects of dry eyes.   Recommend OTC artificial tears 2-4 times a day in both eyes.   Discussed  chronicity of STACY.   RTC if symptoms not alleviated by continued use of artificial tears.     6. Diabetes mellitus type 2 without retinopathy  Discussed possible ocular affects of uncontrolled blood sugar with patient. Recommended continued strong blood sugar control and continued care with PCP. Monitor yearly.

## 2025-06-17 NOTE — PROGRESS NOTES
Assessment /Plan     For exam results, see Encounter Report.    Contact lens/glasses fitting      Patient is here for a comprehensive eye exam and contact lens fit. See other exam visit with same encounter date 06/17/25 for detailed exam information.